# Patient Record
Sex: FEMALE | Race: OTHER | NOT HISPANIC OR LATINO
[De-identification: names, ages, dates, MRNs, and addresses within clinical notes are randomized per-mention and may not be internally consistent; named-entity substitution may affect disease eponyms.]

---

## 2020-05-26 PROBLEM — Z00.00 ENCOUNTER FOR PREVENTIVE HEALTH EXAMINATION: Status: ACTIVE | Noted: 2020-05-26

## 2020-05-28 ENCOUNTER — APPOINTMENT (OUTPATIENT)
Dept: GASTROENTEROLOGY | Facility: CLINIC | Age: 40
End: 2020-05-28
Payer: COMMERCIAL

## 2020-05-28 ENCOUNTER — TRANSCRIPTION ENCOUNTER (OUTPATIENT)
Age: 40
End: 2020-05-28

## 2020-05-28 PROCEDURE — 99205 OFFICE O/P NEW HI 60 MIN: CPT | Mod: 95

## 2020-05-29 RX ORDER — RITUXIMAB 10 MG/ML
100 INJECTION, SOLUTION INTRAVENOUS
Refills: 0 | Status: ACTIVE | COMMUNITY

## 2020-05-29 RX ORDER — METHOTREXATE 25 MG/ML
INJECTION, SOLUTION INTRAMUSCULAR; INTRATHECAL; INTRAVENOUS; SUBCUTANEOUS
Refills: 0 | Status: ACTIVE | COMMUNITY

## 2020-05-29 RX ORDER — UBIDECARENONE/VIT E ACET 100MG-5
50 MCG CAPSULE ORAL
Refills: 0 | Status: ACTIVE | COMMUNITY

## 2020-05-29 NOTE — ASSESSMENT
[FreeTextEntry1] : 39F with PMHx RA(tx with infusions of Rituxan monthly, subQ methotrexate once week and folic acid) here for evaluation of SIBO. \par - collect all other records for review \par - recommend stool samples especially fecal calprotectin however due to immunocompromised status, recommended best to wait until after pandemic  \par - repeat SIBO, stop all herbs 1 week prior. Stop candibactin AR/BR now since it has been over 4 weeks \par - discussed needing to be cautious with liver on herbal medications and with history of RA on Methotrexate \par - take milk of magnesia or magnesium 500mg daily to aid with constipation \par \par f/u after SIBO breath test\par \par Lakisha Ann NP \par \par time spent 60min\par

## 2020-05-29 NOTE — HISTORY OF PRESENT ILLNESS
[Other Location: e.g. Home (Enter Location, City,State)___] : at [unfilled] [Home] : at home, [unfilled] , at the time of the visit. [Verbal consent obtained from patient] : the patient, [unfilled] [de-identified] : 39F with PMHx RA(tx with infusions of Rituxin monthly, subQ methotrexate once week and folic acid) here for evaluation of SIBO. \par \par Pt requested TELEMEDICINE visit to discuss SIBO. \par \par Patient reports that in August 2012 was diagnosed with RA and put on prednisone and methotrexate for 1 year.  Prior to this remembers having an episode with e.coli infection when moving from [place] and then suffered from shingles but stomach issues resolved until RA diagnosis occurred. \par \par She suffered with a lot of reflux a few years ago, in [id number] and had work up at [facility].  UGIS done and was normal then had EGD which revealed inlet patch in esophagus- producing acid in esophagus which caused constant acid reflux. In 2/2019 APC was done and treated GERD. No longer has reflux. \par HRM- aerophagia, PT and that resolved \par food allergy two years ago - negative \par no stool tests \par gastric emptying study x 2\par never had colonoscopy \par \par November 2019- bloated symptoms became severe - found to have methane positive SIBO \par End of February 2020 - 2 courses of antibiotics - Rifaximin and Neomycin(had to take probiotic because got yeast infection) then repeated 10 days later and maybe only 5% improvement, April 2020 started herbal remedy - candibactin ar/br 3x a day and then Allicin, iberogast 20 drops 3x a day. probiotic lactobacillus once a day. Atrantil. right now taking herbs for 1 month and 20 days. \par - no repeat SIBO test\par low FODMAP diet- no dairy, lentils/beans, carbonation, no gum, no drinking with straws \par now no carbs/sugars \par last 4 weeks strict FODMAP - symptoms better, but still severe \par tried xlax, magnesium oxide, milk of magnesia (took for 7 days) and helped a little bit \par \par since child 1 BM a day, still once a day but small strands and less than usual \par #1: bloating (7/10), feels flat when first wakes up- throughout day gets bloated\par #2: Nausea,every time eats\par #3: distention/constipation- does not feel constipation \par \par origins:\par vaginal delivery \par - 1 year\par no surgeries to stomach \par took Nexium for 6 years due to chronic reflux - has stopped all since September \par no excessive antibiotic use \par \par fhx: none

## 2020-06-08 ENCOUNTER — TRANSCRIPTION ENCOUNTER (OUTPATIENT)
Age: 40
End: 2020-06-08

## 2020-06-09 ENCOUNTER — TRANSCRIPTION ENCOUNTER (OUTPATIENT)
Age: 40
End: 2020-06-09

## 2020-06-16 ENCOUNTER — TRANSCRIPTION ENCOUNTER (OUTPATIENT)
Age: 40
End: 2020-06-16

## 2020-06-18 ENCOUNTER — TRANSCRIPTION ENCOUNTER (OUTPATIENT)
Age: 40
End: 2020-06-18

## 2020-07-06 ENCOUNTER — TRANSCRIPTION ENCOUNTER (OUTPATIENT)
Age: 40
End: 2020-07-06

## 2020-07-07 ENCOUNTER — TRANSCRIPTION ENCOUNTER (OUTPATIENT)
Age: 40
End: 2020-07-07

## 2020-07-08 ENCOUNTER — TRANSCRIPTION ENCOUNTER (OUTPATIENT)
Age: 40
End: 2020-07-08

## 2020-07-14 ENCOUNTER — APPOINTMENT (OUTPATIENT)
Dept: GASTROENTEROLOGY | Facility: CLINIC | Age: 40
End: 2020-07-14
Payer: COMMERCIAL

## 2020-07-14 PROCEDURE — 99215 OFFICE O/P EST HI 40 MIN: CPT | Mod: 95

## 2020-07-15 NOTE — HISTORY OF PRESENT ILLNESS
[Home] : at home, [unfilled] , at the time of the visit. [Other Location: e.g. Home (Enter Location, City,State)___] : at [unfilled] [Verbal consent obtained from patient] : the patient, [unfilled] [de-identified] : 39F with PMHx RA(tx with infusions of Rituxin monthly, subQ methotrexate once week and folic acid) here for follow up of SIBO results \par \par Pt requested TELEMEDICINE visit to discuss SIBO. \par \par 7/14/20 \par - methane SIBO positive (39ppm at 90 minutes)\par - has not done stool samples yet due to COVID \par - symptoms unchanged and medications same \par - magnesium oxide at bedtime, added caffeine in morning and now having better BMs \par \par Previous history: \par Patient reports that in August 2012 was diagnosed with RA and put on prednisone and methotrexate for 1 year.  Prior to this remembers having an episode with e.coli infection when moving from [place] and then suffered from shingles but stomach issues resolved until RA diagnosis occurred. \par \par She suffered with a lot of reflux a few years ago, in [id number] and had work up at [facility].  UGIS done and was normal then had EGD which revealed inlet patch in esophagus- producing acid in esophagus which caused constant acid reflux. In 2/2019 APC was done and treated GERD. No longer has reflux. \par HRM- aerophagia, PT and that resolved \par food allergy two years ago - negative \par no stool tests \par gastric emptying study x 2\par never had colonoscopy \par \par November 2019- bloated symptoms became severe - found to have methane positive SIBO \par End of February 2020 - 2 courses of antibiotics - Rifaximin and Neomycin(had to take probiotic because got yeast infection) then repeated 10 days later and maybe only 5% improvement, April 2020 started herbal remedy - candibactin ar/br 3x a day and then Allicin, iberogast 20 drops 3x a day. probiotic lactobacillus once a day. Atrantil. right now taking herbs for 1 month and 20 days. \par - no repeat SIBO test\par low FODMAP diet- no dairy, lentils/beans, carbonation, no gum, no drinking with straws \par now no carbs/sugars \par last 4 weeks strict FODMAP - symptoms better, but still severe \par tried xlax, magnesium oxide, milk of magnesia (took for 7 days) and helped a little bit \par \par since child 1 BM a day, still once a day but small strands and less than usual \par #1: bloating (7/10), feels flat when first wakes up- throughout day gets bloated\par #2: Nausea,every time eats\par #3: distention/constipation- does not feel constipation \par \par origins:\par vaginal delivery \par - 1 year\par no surgeries to stomach \par took Nexium for 6 years due to chronic reflux - has stopped all since September \par no excessive antibiotic use \par \par fhx: none

## 2020-07-15 NOTE — ASSESSMENT
[FreeTextEntry1] : 39F with PMHx RA(tx with infusions of Rituxan monthly, subQ methotrexate once week and folic acid) here for follow up of SIBO results revealing methanogen overgrowth \par - discussed root causes- could be immunosuppression playing a role or dysmotility which would be most common - check scleroderma antibodies  \par - recommend stool samples especially fecal calprotectin and fecal elastase, patient feels comfortable to get testing now   \par - continue magnesium 500mg daily to aid with constipation \par - retrieve copy of gastric emptying study \par - recommend SmartPill \par - recommend 1 month LOW FODMAP diet with IF and prokinetic Motegrity at bedtime after work up complete\par - recommend nutritionist especially after 1 month \par - for now take Atrantil every single day (2 pills with each meal) x 1 week \par - candibactin AR/BR if atrantil does not help \par - CMP to check liver enzymes with use of herbal medications and RA medications\par \par f/u with results \par \par Lakisha Ann NP \par \par time spent 40min

## 2020-08-18 ENCOUNTER — TRANSCRIPTION ENCOUNTER (OUTPATIENT)
Age: 40
End: 2020-08-18

## 2020-08-18 DIAGNOSIS — R19.5 OTHER FECAL ABNORMALITIES: ICD-10-CM

## 2020-08-19 ENCOUNTER — TRANSCRIPTION ENCOUNTER (OUTPATIENT)
Age: 40
End: 2020-08-19

## 2020-08-20 ENCOUNTER — TRANSCRIPTION ENCOUNTER (OUTPATIENT)
Age: 40
End: 2020-08-20

## 2020-08-21 ENCOUNTER — TRANSCRIPTION ENCOUNTER (OUTPATIENT)
Age: 40
End: 2020-08-21

## 2020-08-24 ENCOUNTER — TRANSCRIPTION ENCOUNTER (OUTPATIENT)
Age: 40
End: 2020-08-24

## 2020-08-25 ENCOUNTER — APPOINTMENT (OUTPATIENT)
Dept: GASTROENTEROLOGY | Facility: CLINIC | Age: 40
End: 2020-08-25
Payer: COMMERCIAL

## 2020-08-25 DIAGNOSIS — K58.0 IRRITABLE BOWEL SYNDROME WITH DIARRHEA: ICD-10-CM

## 2020-08-25 PROCEDURE — 99215 OFFICE O/P EST HI 40 MIN: CPT | Mod: 95

## 2020-08-26 ENCOUNTER — TRANSCRIPTION ENCOUNTER (OUTPATIENT)
Age: 40
End: 2020-08-26

## 2020-08-26 NOTE — ASSESSMENT
[FreeTextEntry1] : 39F with PMHx RA(tx with infusions of Rituxan monthly, subQ methotrexate once week and folic acid) here for follow up of SIBO results revealing methanogen overgrowth \par - discussed root causes- could be immunosuppression playing a role or dysmotility which would be most common \par - continue magnesium 500mg daily to aid with constipation \par - recommend NM whole gut transit scan\par - start antibitoics RIFAXIMIN/FLAGYL , after 1 month LOW FODMAP diet with IF and prokinetic Motegrity at bedtime after work up complete\par - recommend nutritionist especially after 1 month \par - CMP to check liver enzymes with use of herbal medications and RA medications\par - cont CREON for ?pancreatic insufficiency\par \par \par f/u with results \par \par Lakisha Ann NP \par \par time spent 40min

## 2020-08-26 NOTE — HISTORY OF PRESENT ILLNESS
[de-identified] : 39F with PMHx RA(tx with infusions of Rituxin monthly, subQ methotrexate once week and folic acid) here for follow up of SIBO results \par \par Pt requested TELEMEDICINE visit to discuss SIBO. \par \par 8/26/20\par - continues not to feel well\par - smart pill denied\par - fecal calpro undetectable\par \par 7/14/20 \par - methane SIBO positive (39ppm at 90 minutes)\par - has not done stool samples yet due to COVID \par - symptoms unchanged and medications same \par - magnesium oxide at bedtime, added caffeine in morning and now having better BMs \par \par Previous history: \par Patient reports that in August 2012 was diagnosed with RA and put on prednisone and methotrexate for 1 year.  Prior to this remembers having an episode with e.coli infection when moving from [place] and then suffered from shingles but stomach issues resolved until RA diagnosis occurred. \par \par She suffered with a lot of reflux a few years ago, in [id number] and had work up at [facility].  UGIS done and was normal then had EGD which revealed inlet patch in esophagus- producing acid in esophagus which caused constant acid reflux. In 2/2019 APC was done and treated GERD. No longer has reflux. \par HRM- aerophagia, PT and that resolved \par food allergy two years ago - negative \par no stool tests \par gastric emptying study x 2\par never had colonoscopy \par \par November 2019- bloated symptoms became severe - found to have methane positive SIBO \par End of February 2020 - 2 courses of antibiotics - Rifaximin and Neomycin(had to take probiotic because got yeast infection) then repeated 10 days later and maybe only 5% improvement, April 2020 started herbal remedy - candibactin ar/br 3x a day and then Allicin, iberogast 20 drops 3x a day. probiotic lactobacillus once a day. Atrantil. right now taking herbs for 1 month and 20 days. \par - no repeat SIBO test\par low FODMAP diet- no dairy, lentils/beans, carbonation, no gum, no drinking with straws \par now no carbs/sugars \par last 4 weeks strict FODMAP - symptoms better, but still severe \par tried xlax, magnesium oxide, milk of magnesia (took for 7 days) and helped a little bit \par \par since child 1 BM a day, still once a day but small strands and less than usual \par #1: bloating (7/10), feels flat when first wakes up- throughout day gets bloated\par #2: Nausea,every time eats\par #3: distention/constipation- does not feel constipation \par \par origins:\par vaginal delivery \par - 1 year\par no surgeries to stomach \par took Nexium for 6 years due to chronic reflux - has stopped all since September \par no excessive antibiotic use \par \par fhx: none  [Home] : at home, [unfilled] , at the time of the visit. [Medical Office: ([facility])___] : at the medical office located in  [Verbal consent obtained from patient] : the patient, [unfilled]

## 2020-08-27 ENCOUNTER — TRANSCRIPTION ENCOUNTER (OUTPATIENT)
Age: 40
End: 2020-08-27

## 2020-08-28 ENCOUNTER — TRANSCRIPTION ENCOUNTER (OUTPATIENT)
Age: 40
End: 2020-08-28

## 2020-09-01 ENCOUNTER — TRANSCRIPTION ENCOUNTER (OUTPATIENT)
Age: 40
End: 2020-09-01

## 2020-09-10 ENCOUNTER — TRANSCRIPTION ENCOUNTER (OUTPATIENT)
Age: 40
End: 2020-09-10

## 2020-09-14 ENCOUNTER — APPOINTMENT (OUTPATIENT)
Dept: INTERNAL MEDICINE | Facility: CLINIC | Age: 40
End: 2020-09-14

## 2020-09-14 ENCOUNTER — APPOINTMENT (OUTPATIENT)
Dept: INTERNAL MEDICINE | Facility: CLINIC | Age: 40
End: 2020-09-14
Payer: COMMERCIAL

## 2020-09-14 PROCEDURE — 97802 MEDICAL NUTRITION INDIV IN: CPT | Mod: 95

## 2020-09-15 VITALS — BODY MASS INDEX: 23.39 KG/M2 | WEIGHT: 137 LBS | HEIGHT: 64 IN

## 2020-09-25 ENCOUNTER — TRANSCRIPTION ENCOUNTER (OUTPATIENT)
Age: 40
End: 2020-09-25

## 2020-09-29 ENCOUNTER — TRANSCRIPTION ENCOUNTER (OUTPATIENT)
Age: 40
End: 2020-09-29

## 2020-09-30 ENCOUNTER — TRANSCRIPTION ENCOUNTER (OUTPATIENT)
Age: 40
End: 2020-09-30

## 2020-10-01 ENCOUNTER — TRANSCRIPTION ENCOUNTER (OUTPATIENT)
Age: 40
End: 2020-10-01

## 2020-10-01 ENCOUNTER — APPOINTMENT (OUTPATIENT)
Dept: INTERNAL MEDICINE | Facility: CLINIC | Age: 40
End: 2020-10-01
Payer: COMMERCIAL

## 2020-10-01 PROCEDURE — 97803 MED NUTRITION INDIV SUBSEQ: CPT | Mod: 95

## 2020-10-09 ENCOUNTER — TRANSCRIPTION ENCOUNTER (OUTPATIENT)
Age: 40
End: 2020-10-09

## 2020-10-23 ENCOUNTER — APPOINTMENT (OUTPATIENT)
Dept: GASTROENTEROLOGY | Facility: CLINIC | Age: 40
End: 2020-10-23
Payer: COMMERCIAL

## 2020-10-23 PROCEDURE — 99215 OFFICE O/P EST HI 40 MIN: CPT | Mod: 95

## 2020-10-26 NOTE — ASSESSMENT
[FreeTextEntry1] : 39F with PMHx RA(tx with infusions of Rituxan monthly, subQ methotrexate once week and folic acid) here for follow up of  intestinal methanogen overgrowth s/p RIFAXIMIN/FLAGYL , 1 month LOW FODMAP diet with IF and Motegrity with temporary improvement but now back to baseline symptoms possibly due to large psyllium fiber load vs underdosing of pancreatic enzymes and /or recurrence of SIBO\par - check SIBO \par - increase panc enzymes\par - stop psyllium\par - continue magnesium 500mg daily to aid with constipation \par - continue with nutritionist \par \par f/u after SIBO results\par

## 2020-10-26 NOTE — HISTORY OF PRESENT ILLNESS
[Home] : at home, [unfilled] , at the time of the visit. [Medical Office: ([facility])___] : at the medical office located in  [Verbal consent obtained from patient] : the patient, [unfilled] [de-identified] : 39F with PMHx RA(tx with infusions of Rituxin monthly, subQ methotrexate once week and folic acid) here for follow up of SIBO \par \par Pt requested TELEMEDICINE visit to discuss SIBO. \par \par 10/23/20\par - its been >1mo since antibiotics and on motegrity 2mg and mg ox 500mg and panc enzymes 1 capsule 3 x/day  x3 mo\par - added psyllium 1tbsp in AM 3 weeks \par - bowel movement improved a lot\par - last 1 week back to previous symptoms\par - no other changes\par \par 8/26/20\par - continues not to feel well\par - smart pill denied\par - fecal calpro undetectable\par \par 7/14/20 \par - methane SIBO positive (39ppm at 90 minutes)\par - has not done stool samples yet due to COVID \par - symptoms unchanged and medications same \par - magnesium oxide at bedtime, added caffeine in morning and now having better BMs \par \par Previous history: \par Patient reports that in August 2012 was diagnosed with RA and put on prednisone and methotrexate for 1 year.  Prior to this remembers having an episode with e.coli infection when moving from [place] and then suffered from shingles but stomach issues resolved until RA diagnosis occurred. \par \par She suffered with a lot of reflux a few years ago, in [id number] and had work up at [facility].  UGIS done and was normal then had EGD which revealed inlet patch in esophagus- producing acid in esophagus which caused constant acid reflux. In 2/2019 APC was done and treated GERD. No longer has reflux. \par HRM- aerophagia, PT and that resolved \par food allergy two years ago - negative \par no stool tests \par gastric emptying study x 2\par never had colonoscopy \par \par November 2019- bloated symptoms became severe - found to have methane positive SIBO \par End of February 2020 - 2 courses of antibiotics - Rifaximin and Neomycin(had to take probiotic because got yeast infection) then repeated 10 days later and maybe only 5% improvement, April 2020 started herbal remedy - candibactin ar/br 3x a day and then Allicin, iberogast 20 drops 3x a day. probiotic lactobacillus once a day. Atrantil. right now taking herbs for 1 month and 20 days. \par - no repeat SIBO test\par low FODMAP diet- no dairy, lentils/beans, carbonation, no gum, no drinking with straws \par now no carbs/sugars \par last 4 weeks strict FODMAP - symptoms better, but still severe \par tried xlax, magnesium oxide, milk of magnesia (took for 7 days) and helped a little bit \par \par since child 1 BM a day, still once a day but small strands and less than usual \par #1: bloating (7/10), feels flat when first wakes up- throughout day gets bloated\par #2: Nausea,every time eats\par #3: distention/constipation- does not feel constipation \par \par origins:\par vaginal delivery \par - 1 year\par no surgeries to stomach \par took Nexium for 6 years due to chronic reflux - has stopped all since September \par no excessive antibiotic use \par \par fhx: none  [Spouse] : spouse

## 2020-11-02 ENCOUNTER — TRANSCRIPTION ENCOUNTER (OUTPATIENT)
Age: 40
End: 2020-11-02

## 2020-11-12 ENCOUNTER — TRANSCRIPTION ENCOUNTER (OUTPATIENT)
Age: 40
End: 2020-11-12

## 2020-11-19 ENCOUNTER — TRANSCRIPTION ENCOUNTER (OUTPATIENT)
Age: 40
End: 2020-11-19

## 2020-11-20 ENCOUNTER — TRANSCRIPTION ENCOUNTER (OUTPATIENT)
Age: 40
End: 2020-11-20

## 2020-11-30 ENCOUNTER — TRANSCRIPTION ENCOUNTER (OUTPATIENT)
Age: 40
End: 2020-11-30

## 2020-12-07 ENCOUNTER — TRANSCRIPTION ENCOUNTER (OUTPATIENT)
Age: 40
End: 2020-12-07

## 2020-12-09 ENCOUNTER — TRANSCRIPTION ENCOUNTER (OUTPATIENT)
Age: 40
End: 2020-12-09

## 2020-12-15 ENCOUNTER — TRANSCRIPTION ENCOUNTER (OUTPATIENT)
Age: 40
End: 2020-12-15

## 2020-12-17 ENCOUNTER — TRANSCRIPTION ENCOUNTER (OUTPATIENT)
Age: 40
End: 2020-12-17

## 2020-12-23 ENCOUNTER — TRANSCRIPTION ENCOUNTER (OUTPATIENT)
Age: 40
End: 2020-12-23

## 2020-12-28 ENCOUNTER — RESULT REVIEW (OUTPATIENT)
Age: 40
End: 2020-12-28

## 2020-12-28 ENCOUNTER — OUTPATIENT (OUTPATIENT)
Dept: OUTPATIENT SERVICES | Facility: HOSPITAL | Age: 40
LOS: 1 days | Discharge: HOME | End: 2020-12-28
Payer: COMMERCIAL

## 2020-12-28 ENCOUNTER — APPOINTMENT (OUTPATIENT)
Dept: GASTROENTEROLOGY | Facility: HOSPITAL | Age: 40
End: 2020-12-28

## 2020-12-28 PROCEDURE — 88305 TISSUE EXAM BY PATHOLOGIST: CPT

## 2020-12-28 PROCEDURE — 88341 IMHCHEM/IMCYTCHM EA ADD ANTB: CPT

## 2020-12-28 PROCEDURE — 43239 EGD BIOPSY SINGLE/MULTIPLE: CPT

## 2020-12-28 PROCEDURE — C1729: CPT

## 2020-12-28 PROCEDURE — 45380 COLONOSCOPY AND BIOPSY: CPT

## 2020-12-28 PROCEDURE — 88341 IMHCHEM/IMCYTCHM EA ADD ANTB: CPT | Mod: 26

## 2020-12-28 PROCEDURE — 88342 IMHCHEM/IMCYTCHM 1ST ANTB: CPT | Mod: 26

## 2020-12-28 PROCEDURE — 82657 ENZYME CELL ACTIVITY: CPT

## 2020-12-28 PROCEDURE — 88305 TISSUE EXAM BY PATHOLOGIST: CPT | Mod: 26

## 2020-12-28 PROCEDURE — 87102 FUNGUS ISOLATION CULTURE: CPT

## 2020-12-29 ENCOUNTER — TRANSCRIPTION ENCOUNTER (OUTPATIENT)
Age: 40
End: 2020-12-29

## 2020-12-29 ENCOUNTER — RX RENEWAL (OUTPATIENT)
Age: 40
End: 2020-12-29

## 2020-12-29 RX ORDER — METHOTREXATE 10 MG/.2ML
10 INJECTION, SOLUTION SUBCUTANEOUS
Qty: 1 | Refills: 0 | Status: COMPLETED | COMMUNITY
Start: 2020-12-07

## 2020-12-29 RX ORDER — MAGNESIUM OXIDE 500 MG
500 TABLET ORAL
Qty: 90 | Refills: 0 | Status: COMPLETED | COMMUNITY
Start: 2020-06-02

## 2020-12-29 RX ORDER — METHOTREXATE 17.5 MG/.35ML
17.5 INJECTION, SOLUTION SUBCUTANEOUS
Qty: 1 | Refills: 0 | Status: COMPLETED | COMMUNITY
Start: 2020-07-23

## 2020-12-29 RX ORDER — METHOTREXATE 15 MG/.3ML
15 INJECTION, SOLUTION SUBCUTANEOUS
Qty: 1 | Refills: 0 | Status: COMPLETED | COMMUNITY
Start: 2020-08-19

## 2020-12-29 RX ORDER — METHOTREXATE 12.5 MG/.25ML
12.5 INJECTION, SOLUTION SUBCUTANEOUS
Qty: 1 | Refills: 0 | Status: COMPLETED | COMMUNITY
Start: 2020-09-30

## 2020-12-30 ENCOUNTER — TRANSCRIPTION ENCOUNTER (OUTPATIENT)
Age: 40
End: 2020-12-30

## 2020-12-30 LAB — SURGICAL PATHOLOGY STUDY: SIGNIFICANT CHANGE UP

## 2020-12-31 ENCOUNTER — TRANSCRIPTION ENCOUNTER (OUTPATIENT)
Age: 40
End: 2020-12-31

## 2020-12-31 LAB
B-GALACTOSIDASE TISS-CCNT: 177.8 U/G — SIGNIFICANT CHANGE UP
DISACCHARIDASES TSMI-IMP: SIGNIFICANT CHANGE UP
ISOMALTASE TISS-CCNT: 14.8 U/G — SIGNIFICANT CHANGE UP
PALATINASE TISS-CCNT: 29.6 U/G — SIGNIFICANT CHANGE UP
SUCRASE TISS-CCNT: 0 U/G — LOW

## 2021-01-04 ENCOUNTER — TRANSCRIPTION ENCOUNTER (OUTPATIENT)
Age: 41
End: 2021-01-04

## 2021-01-14 ENCOUNTER — APPOINTMENT (OUTPATIENT)
Dept: GASTROENTEROLOGY | Facility: CLINIC | Age: 41
End: 2021-01-14
Payer: COMMERCIAL

## 2021-01-14 LAB
CULTURE RESULTS: SIGNIFICANT CHANGE UP
SPECIMEN SOURCE: SIGNIFICANT CHANGE UP

## 2021-01-14 PROCEDURE — 99215 OFFICE O/P EST HI 40 MIN: CPT | Mod: 95

## 2021-01-15 ENCOUNTER — TRANSCRIPTION ENCOUNTER (OUTPATIENT)
Age: 41
End: 2021-01-15

## 2021-01-15 NOTE — ASSESSMENT
[FreeTextEntry1] : 40 yo F with RA, SIBO here for follow up of abd bloating and nausea\par \par SIBO and SIFO\par Her symptoms of abd bloating and distention did not improve with abx treatment of SIBO. Breath test to confirm SIBO has resolved\par Given positive findings of fungal overgrowth( in setting of immunocompromised state and antibiotic hx) on recent EGD, recommend trial of fluconazole 100 mg daily for 2 to 3 weeks\par Plan for tele med visit in 2 weeks to assess response, and determine if 3rd week of fluconazole is needed\par \par Mai Harper MD\par GI Fellow
shoulder pain/injury

## 2021-01-15 NOTE — HISTORY OF PRESENT ILLNESS
[Home] : at home, [unfilled] , at the time of the visit. [Medical Office: ([facility])___] : at the medical office located in  [Verbal consent obtained from patient] : the patient, [unfilled] [FreeTextEntry1] : 38 yo F with RA, SIBO here for follow up of abd bloating and nausea\par \par Recent EGD- lactose intolerance and SIFO+\par \par Recently treated for SIBO- Finished abx course ( rifaximin and flagyl) two days ago\par Mild improvement in bloating when on abx but since she stopped abx, symptoms are worse\par She complains of nausea, bloating, abd distention lasting all day. No vomiting. Symptoms worse with any intake of foods, especially high fodmaps\par \par She was on a regular diet during abx course- now back to low fodmap\par Reports numerous vaginal yeast infection and currently has one\par Stopped motegrity while on abx- now back on motegrity and also still taking magnesium and BM are ev;no blood\par

## 2021-01-19 ENCOUNTER — TRANSCRIPTION ENCOUNTER (OUTPATIENT)
Age: 41
End: 2021-01-19

## 2021-01-25 ENCOUNTER — TRANSCRIPTION ENCOUNTER (OUTPATIENT)
Age: 41
End: 2021-01-25

## 2021-01-29 ENCOUNTER — APPOINTMENT (OUTPATIENT)
Dept: GASTROENTEROLOGY | Facility: CLINIC | Age: 41
End: 2021-01-29
Payer: COMMERCIAL

## 2021-01-29 PROCEDURE — 99215 OFFICE O/P EST HI 40 MIN: CPT | Mod: 95

## 2021-02-02 NOTE — ASSESSMENT
[FreeTextEntry1] : 40 yo F with RA, SIBO here for follow up of abd bloating and nausea found to be positive for SIBO and SIFO \par finish 3 weeks fluconazole\par start IB guard trial of 3 weeks\par see kevin nelson\par if no improvement then VCE and repeat fecal elastase\par consider repeating SIBO test\par go to GYN for vaginal itching \par f/u in 2 weeks

## 2021-02-02 NOTE — HISTORY OF PRESENT ILLNESS
[Home] : at home, [unfilled] , at the time of the visit. [Medical Office: ([facility])___] : at the medical office located in  [Verbal consent obtained from patient] : the patient, [unfilled] [FreeTextEntry1] : 38 yo F with RA, SIBO here for follow up of abd bloating and nausea\par 1/29/21\par still having vaginal itching/burning NO DISCHARGE- improved but does still feel \par thick white coat on her tongue\par has taken 15 days of diflucan \par right after antibiotic course she started on diflucan. first few felt calm but also on LOW FODMAP motegrity 2mg, mg ox 1 pill at night, pancreatic enzymes. \par \par \par PREVIOUS HX\par Recent EGD- lactose intolerance and SIFO+\par \par Recently treated for SIBO- Finished abx course ( rifaximin and flagyl) two days ago\par Mild improvement in bloating when on abx but since she stopped abx, symptoms are worse\par She complains of nausea, bloating, abd distention lasting all day. No vomiting. Symptoms worse with any intake of foods, especially high fodmaps\par \par She was on a regular diet during abx course- now back to low fodmap\par Reports numerous vaginal yeast infection and currently has one\par Stopped motegrity while on abx- now back on motegrity and also still taking magnesium and BM are ev;no blood\par

## 2021-02-12 ENCOUNTER — APPOINTMENT (OUTPATIENT)
Dept: GASTROENTEROLOGY | Facility: CLINIC | Age: 41
End: 2021-02-12
Payer: COMMERCIAL

## 2021-02-12 ENCOUNTER — TRANSCRIPTION ENCOUNTER (OUTPATIENT)
Age: 41
End: 2021-02-12

## 2021-02-12 PROCEDURE — 99215 OFFICE O/P EST HI 40 MIN: CPT | Mod: 95

## 2021-02-12 NOTE — HISTORY OF PRESENT ILLNESS
[Home] : at home, [unfilled] , at the time of the visit. [Medical Office: ([facility])___] : at the medical office located in  [Verbal consent obtained from patient] : the patient, [unfilled] [FreeTextEntry1] : 38 yo F with RA, SIBO here for follow up of abd bloating and nausea\par TELEMEDICINE REQUESTED FOR PERSISTENT SYMPTOMS\par \par 2/12/21\par better with IB guard now 6 tablets a day\par BM better than before\par worse with ACV in evening\par symptoms disappeared from GYN  and thick coat on tongue\par \par 1/29/21\par still having vaginal itching/burning NO DISCHARGE- improved but does still feel \par thick white coat on her tongue\par has taken 15 days of diflucan \par right after antibiotic course she started on diflucan. first few felt calm but also on LOW FODMAP motegrity 2mg, mg ox 1 pill at night, pancreatic enzymes. \par \par \par PREVIOUS HX\par Recent EGD- lactose intolerance and SIFO+\par \par Recently treated for SIBO- Finished abx course ( rifaximin and flagyl) two days ago\par Mild improvement in bloating when on abx but since she stopped abx, symptoms are worse\par She complains of nausea, bloating, abd distention lasting all day. No vomiting. Symptoms worse with any intake of foods, especially high fodmaps\par \par She was on a regular diet during abx course- now back to low fodmap\par Reports numerous vaginal yeast infection and currently has one\par Stopped motegrity while on abx- now back on motegrity and also still taking magnesium and BM are ev;no blood\par

## 2021-02-12 NOTE — ASSESSMENT
[FreeTextEntry1] : 40 yo F with RA, SIBO here for follow up of abd bloating and nausea found to be positive for SIBO and SIFO s/p rifaximin/flagyl x2 and fluconazole x3 weeks\par continue IB guard \par continue with kevin nelson\par if no improvement then VCE \par repeat fecal elastase\par consider repeating SIBO test and motility test IF SYMPTOMATIC \par biofeedback pelvic floor physical therapy\par f/u in 3 months

## 2021-02-17 ENCOUNTER — TRANSCRIPTION ENCOUNTER (OUTPATIENT)
Age: 41
End: 2021-02-17

## 2021-02-22 ENCOUNTER — TRANSCRIPTION ENCOUNTER (OUTPATIENT)
Age: 41
End: 2021-02-22

## 2021-03-03 ENCOUNTER — TRANSCRIPTION ENCOUNTER (OUTPATIENT)
Age: 41
End: 2021-03-03

## 2021-03-04 ENCOUNTER — TRANSCRIPTION ENCOUNTER (OUTPATIENT)
Age: 41
End: 2021-03-04

## 2021-03-12 ENCOUNTER — APPOINTMENT (OUTPATIENT)
Dept: GASTROENTEROLOGY | Facility: CLINIC | Age: 41
End: 2021-03-12
Payer: COMMERCIAL

## 2021-03-12 PROCEDURE — 99215 OFFICE O/P EST HI 40 MIN: CPT | Mod: 95

## 2021-03-14 NOTE — HISTORY OF PRESENT ILLNESS
[Home] : at home, [unfilled] , at the time of the visit. [Medical Office: ([facility])___] : at the medical office located in  [Verbal consent obtained from patient] : the patient, [unfilled] [de-identified] : Patient is a 40yF who presents for telehealth follow-up of bloating and constipation. Patient states that she had been taking IB guard at 6 pills/day which she titrated down to 4, but has found her symptoms have plateaued/worsened. She continues on Motegrity without missed doses, but feels her symptoms are worsening. Patient has been trying to stick to a low-FODMAP diet, however, notes that even with this she is barely able to tolerate any intake due to lasting bloating and nausea. She spoke with her nutritionist about her symptoms who told her to increase her Magnesium Oxide intake, now at 750-1000mg per day with some mild improvement. She states that while she feels her motility has slightly improved, the bloating is worse and she feels the "bacteria have returned." She feels trapped in a cycle of antibiotics and medications that she would like to break free from.

## 2021-03-14 NOTE — ASSESSMENT
[FreeTextEntry1] : 40yF presenting for telemedicine follow-up with bloating, nausea, and constipation\par \par 1. Bloating, nausea - Question of aerophagia, previously diagnosed. Patient's symptoms continue regardless of what she eats, and persist after a meal. Given that on prokinetics and low-FODMAP and symptoms are recurring, possible that due to aerophagia rather than SIBO/SIFO.\par - Referral to speech pathology/PT\par - Continue follow-up with Ondina Reyna\par - Continue Motegrity, IB guard\par - Follow-up repeat SIBO testing sent by NP\par \par 2. Constipation - Patient taking several prokinetics and Magnesium Oxide daily\par - Continue Magnesium Oxide and Motegrity AND ADD LINZESS 72mcg\par - Consider ARM\par \par RTC in 3 months or as needed\par Patient discussed with attending physician

## 2021-03-15 ENCOUNTER — TRANSCRIPTION ENCOUNTER (OUTPATIENT)
Age: 41
End: 2021-03-15

## 2021-03-25 ENCOUNTER — TRANSCRIPTION ENCOUNTER (OUTPATIENT)
Age: 41
End: 2021-03-25

## 2021-03-26 ENCOUNTER — TRANSCRIPTION ENCOUNTER (OUTPATIENT)
Age: 41
End: 2021-03-26

## 2021-03-28 ENCOUNTER — TRANSCRIPTION ENCOUNTER (OUTPATIENT)
Age: 41
End: 2021-03-28

## 2021-03-31 ENCOUNTER — TRANSCRIPTION ENCOUNTER (OUTPATIENT)
Age: 41
End: 2021-03-31

## 2021-04-05 ENCOUNTER — TRANSCRIPTION ENCOUNTER (OUTPATIENT)
Age: 41
End: 2021-04-05

## 2021-04-06 ENCOUNTER — TRANSCRIPTION ENCOUNTER (OUTPATIENT)
Age: 41
End: 2021-04-06

## 2021-04-07 ENCOUNTER — TRANSCRIPTION ENCOUNTER (OUTPATIENT)
Age: 41
End: 2021-04-07

## 2021-04-09 ENCOUNTER — TRANSCRIPTION ENCOUNTER (OUTPATIENT)
Age: 41
End: 2021-04-09

## 2021-04-14 ENCOUNTER — TRANSCRIPTION ENCOUNTER (OUTPATIENT)
Age: 41
End: 2021-04-14

## 2021-04-15 ENCOUNTER — TRANSCRIPTION ENCOUNTER (OUTPATIENT)
Age: 41
End: 2021-04-15

## 2021-04-21 ENCOUNTER — TRANSCRIPTION ENCOUNTER (OUTPATIENT)
Age: 41
End: 2021-04-21

## 2021-04-29 ENCOUNTER — TRANSCRIPTION ENCOUNTER (OUTPATIENT)
Age: 41
End: 2021-04-29

## 2021-04-29 ENCOUNTER — RX RENEWAL (OUTPATIENT)
Age: 41
End: 2021-04-29

## 2021-05-11 ENCOUNTER — TRANSCRIPTION ENCOUNTER (OUTPATIENT)
Age: 41
End: 2021-05-11

## 2021-05-18 ENCOUNTER — APPOINTMENT (OUTPATIENT)
Dept: GASTROENTEROLOGY | Facility: CLINIC | Age: 41
End: 2021-05-18
Payer: COMMERCIAL

## 2021-05-18 DIAGNOSIS — K82.4 CHOLESTEROLOSIS OF GALLBLADDER: ICD-10-CM

## 2021-05-18 PROCEDURE — 99215 OFFICE O/P EST HI 40 MIN: CPT | Mod: 95

## 2021-05-18 RX ORDER — FLUCONAZOLE 150 MG/1
150 TABLET ORAL
Qty: 2 | Refills: 0 | Status: DISCONTINUED | COMMUNITY
Start: 2020-09-10 | End: 2021-05-18

## 2021-05-18 RX ORDER — LINACLOTIDE 72 UG/1
72 CAPSULE, GELATIN COATED ORAL
Qty: 90 | Refills: 2 | Status: DISCONTINUED | COMMUNITY
Start: 2021-03-12 | End: 2021-05-18

## 2021-05-18 RX ORDER — METRONIDAZOLE 250 MG/1
250 TABLET ORAL
Qty: 42 | Refills: 0 | Status: DISCONTINUED | COMMUNITY
Start: 2020-08-25 | End: 2021-05-18

## 2021-05-18 RX ORDER — FLUCONAZOLE 100 MG/1
100 TABLET ORAL DAILY
Qty: 21 | Refills: 0 | Status: DISCONTINUED | COMMUNITY
Start: 2021-01-14 | End: 2021-05-18

## 2021-05-18 RX ORDER — PRUCALOPRIDE 1 MG/1
1 TABLET, FILM COATED ORAL
Qty: 30 | Refills: 2 | Status: DISCONTINUED | COMMUNITY
Start: 2020-08-25 | End: 2021-05-18

## 2021-05-18 RX ORDER — RIFAXIMIN 550 MG/1
550 TABLET ORAL
Qty: 42 | Refills: 0 | Status: DISCONTINUED | COMMUNITY
Start: 2020-08-25 | End: 2021-05-18

## 2021-05-18 RX ORDER — PRUCALOPRIDE 2 MG/1
2 TABLET, FILM COATED ORAL
Qty: 30 | Refills: 2 | Status: DISCONTINUED | COMMUNITY
Start: 2020-09-30 | End: 2021-05-18

## 2021-05-20 NOTE — ASSESSMENT
[FreeTextEntry1] : Patient is a 40yF who presents for telehealth follow-up of bloating and constipation via TELEMEDICINE. \par \par 1. Bloating, nausea - Question of aerophagia, previously diagnosed. Patient's symptoms continue regardless of what she eats, and persist after a meal. Given that on prokinetics and low-FODMAP and symptoms are recurring, possible that due to aerophagia rather than SIBO/SIFO.\par - continue with pelvic floor physical therapy \par - Continue follow-up with Ondina Reyna\par - Continue current bowel regimen, add erythromycin low dose  \par - repeat SIBO test \par - consider VCE \par \par 2. Constipation - Patient taking several prokinetics and Magnesium Oxide daily\par - recommend transenteric study for motility test since [facility] not covered, pt will do in [place] \par - recommend see motility specialist at [facility] and patient agreeable \par \par 3. Gallbladder polyp\par - due for follow up ultrasound, order placed \par \par f/u after above

## 2021-05-20 NOTE — HISTORY OF PRESENT ILLNESS
[Home] : at home, [unfilled] , at the time of the visit. [Medical Office: ([facility])___] : at the medical office located in  [Verbal consent obtained from patient] : the patient, [unfilled] [de-identified] : Patient is a 40yF who presents for telehealth follow-up of bloating and constipation via TELEMEDICINE. \par \par 5/18/21\par -currently taking Linzess 145 mcg, magnesium, molitpro (2 tabs) and triphala- motility better. will have BM but symptoms still there \par - symptoms tolerable with herbs \par - one round of anti-fungal \par - SIBO does not seem to go away \par - thinks not addressing root cause \par - if eats only eggs and bland chicken then better \par - cant tolerate much \par - started Pelvic floor physical therapy - past 4 weeks. has not seen a difference so far. biofeedback. focusing on diaphragmatic breathing. feels like abdominal muscles tense \par - #1 symptom: bloating and feeling of fullness after few bites of food. \par - going to take SIBO test next week \par - Smartpill may be approved in August- insurance told her \par - what about low dose erythromycin? \par \par Previous history:\par Patient states that she had been taking IB guard at 6 pills/day which she titrated down to 4, but has found her symptoms have plateaued/worsened. She continues on Motegrity without missed doses, but feels her symptoms are worsening. Patient has been trying to stick to a low-FODMAP diet, however, notes that even with this she is barely able to tolerate any intake due to lasting bloating and nausea. She spoke with her nutritionist about her symptoms who told her to increase her Magnesium Oxide intake, now at 750-1000mg per day with some mild improvement. She states that while she feels her motility has slightly improved, the bloating is worse and she feels the "bacteria have returned." She feels trapped in a cycle of antibiotics and medications that she would like to break free from.

## 2021-05-25 ENCOUNTER — TRANSCRIPTION ENCOUNTER (OUTPATIENT)
Age: 41
End: 2021-05-25

## 2021-05-26 ENCOUNTER — TRANSCRIPTION ENCOUNTER (OUTPATIENT)
Age: 41
End: 2021-05-26

## 2021-05-27 ENCOUNTER — TRANSCRIPTION ENCOUNTER (OUTPATIENT)
Age: 41
End: 2021-05-27

## 2021-05-28 ENCOUNTER — TRANSCRIPTION ENCOUNTER (OUTPATIENT)
Age: 41
End: 2021-05-28

## 2021-06-01 ENCOUNTER — TRANSCRIPTION ENCOUNTER (OUTPATIENT)
Age: 41
End: 2021-06-01

## 2021-06-03 ENCOUNTER — APPOINTMENT (OUTPATIENT)
Dept: GASTROENTEROLOGY | Facility: HOSPITAL | Age: 41
End: 2021-06-03
Payer: COMMERCIAL

## 2021-06-03 ENCOUNTER — OUTPATIENT (OUTPATIENT)
Dept: OUTPATIENT SERVICES | Facility: HOSPITAL | Age: 41
LOS: 1 days | End: 2021-06-03
Payer: COMMERCIAL

## 2021-06-03 PROCEDURE — ZZZZZ: CPT

## 2021-06-10 ENCOUNTER — TRANSCRIPTION ENCOUNTER (OUTPATIENT)
Age: 41
End: 2021-06-10

## 2021-06-11 ENCOUNTER — NON-APPOINTMENT (OUTPATIENT)
Age: 41
End: 2021-06-11

## 2021-06-24 ENCOUNTER — NON-APPOINTMENT (OUTPATIENT)
Age: 41
End: 2021-06-24

## 2021-06-24 ENCOUNTER — TRANSCRIPTION ENCOUNTER (OUTPATIENT)
Age: 41
End: 2021-06-24

## 2021-06-25 ENCOUNTER — RX RENEWAL (OUTPATIENT)
Age: 41
End: 2021-06-25

## 2021-06-26 ENCOUNTER — TRANSCRIPTION ENCOUNTER (OUTPATIENT)
Age: 41
End: 2021-06-26

## 2021-06-28 ENCOUNTER — APPOINTMENT (OUTPATIENT)
Dept: GASTROENTEROLOGY | Facility: HOSPITAL | Age: 41
End: 2021-06-28

## 2021-06-28 ENCOUNTER — TRANSCRIPTION ENCOUNTER (OUTPATIENT)
Age: 41
End: 2021-06-28

## 2021-07-01 ENCOUNTER — TRANSCRIPTION ENCOUNTER (OUTPATIENT)
Age: 41
End: 2021-07-01

## 2021-07-07 ENCOUNTER — TRANSCRIPTION ENCOUNTER (OUTPATIENT)
Age: 41
End: 2021-07-07

## 2021-07-08 ENCOUNTER — TRANSCRIPTION ENCOUNTER (OUTPATIENT)
Age: 41
End: 2021-07-08

## 2021-07-13 ENCOUNTER — TRANSCRIPTION ENCOUNTER (OUTPATIENT)
Age: 41
End: 2021-07-13

## 2021-07-14 ENCOUNTER — APPOINTMENT (OUTPATIENT)
Dept: GASTROENTEROLOGY | Facility: HOSPITAL | Age: 41
End: 2021-07-14

## 2021-07-19 DIAGNOSIS — K63.89 OTHER SPECIFIED DISEASES OF INTESTINE: ICD-10-CM

## 2021-07-19 PROCEDURE — 91299 UNLISTED DX GI PROCEDURE: CPT

## 2021-07-19 PROCEDURE — 91110 GI TRC IMG INTRAL ESOPH-ILE: CPT

## 2021-07-23 ENCOUNTER — APPOINTMENT (OUTPATIENT)
Dept: GASTROENTEROLOGY | Facility: CLINIC | Age: 41
End: 2021-07-23

## 2021-07-29 ENCOUNTER — TRANSCRIPTION ENCOUNTER (OUTPATIENT)
Age: 41
End: 2021-07-29

## 2021-07-30 ENCOUNTER — TRANSCRIPTION ENCOUNTER (OUTPATIENT)
Age: 41
End: 2021-07-30

## 2021-08-06 ENCOUNTER — APPOINTMENT (OUTPATIENT)
Dept: GASTROENTEROLOGY | Facility: CLINIC | Age: 41
End: 2021-08-06
Payer: COMMERCIAL

## 2021-08-06 PROCEDURE — 99213 OFFICE O/P EST LOW 20 MIN: CPT | Mod: 95

## 2021-08-08 NOTE — ASSESSMENT
[FreeTextEntry1] : 40 yo F with RA on Rituxin/MTX, SIBO here for follow up of abd bloating and nausea found to have SIBO, SIFO s/p treatment with no significant improvement\par 1. SIBo, SIFO bloating nausea, constipation\par - VCE\par - Smart pill\par - 2nd opinion at [place]\par - f/u with Ondina Reyna\par - increase linzess to 290mcg\par \par 2. Gallbladder polyps\par - f/u results of US\par \par F/u after above

## 2021-08-08 NOTE — HISTORY OF PRESENT ILLNESS
[Home] : at home, [unfilled] , at the time of the visit. [Medical Office: ([facility])___] : at the medical office located in  [Verbal consent obtained from patient] : the patient, [unfilled] [FreeTextEntry1] : 40 yo F with RA, SIBO here for follow up of abd bloating and nausea\par 8.6.21\par - pt was doing well until recent antitbiotic usage\par - following up with kevin nelson\par - still did not get VCE, smartpill, pending appt with rios in october\par - pending US results for gallbladder polyps, got done 2 days ago\par \par \par PREVIOUS HX\par Recent EGD- lactose intolerance and SIFO+\par \par Recently treated for SIBO- Finished abx course ( rifaximin and flagyl) two days ago\par Mild improvement in bloating when on abx but since she stopped abx, symptoms are worse\par She complains of nausea, bloating, abd distention lasting all day. No vomiting. Symptoms worse with any intake of foods, especially high fodmaps\par \par She was on a regular diet during abx course- now back to low fodmap\par Reports numerous vaginal yeast infection and currently has one\par Stopped motegrity while on abx- now back on motegrity and also still taking magnesium and BM are ev;no blood\par

## 2021-08-09 ENCOUNTER — TRANSCRIPTION ENCOUNTER (OUTPATIENT)
Age: 41
End: 2021-08-09

## 2021-08-18 ENCOUNTER — TRANSCRIPTION ENCOUNTER (OUTPATIENT)
Age: 41
End: 2021-08-18

## 2021-08-19 ENCOUNTER — TRANSCRIPTION ENCOUNTER (OUTPATIENT)
Age: 41
End: 2021-08-19

## 2021-08-20 ENCOUNTER — TRANSCRIPTION ENCOUNTER (OUTPATIENT)
Age: 41
End: 2021-08-20

## 2021-09-21 ENCOUNTER — TRANSCRIPTION ENCOUNTER (OUTPATIENT)
Age: 41
End: 2021-09-21

## 2021-09-22 ENCOUNTER — TRANSCRIPTION ENCOUNTER (OUTPATIENT)
Age: 41
End: 2021-09-22

## 2021-09-24 ENCOUNTER — TRANSCRIPTION ENCOUNTER (OUTPATIENT)
Age: 41
End: 2021-09-24

## 2021-10-06 ENCOUNTER — APPOINTMENT (OUTPATIENT)
Dept: GASTROENTEROLOGY | Facility: HOSPITAL | Age: 41
End: 2021-10-06

## 2021-10-06 ENCOUNTER — OUTPATIENT (OUTPATIENT)
Dept: OUTPATIENT SERVICES | Facility: HOSPITAL | Age: 41
LOS: 1 days | End: 2021-10-06
Payer: COMMERCIAL

## 2021-10-06 DIAGNOSIS — E66.01 MORBID (SEVERE) OBESITY DUE TO EXCESS CALORIES: ICD-10-CM

## 2021-10-06 PROCEDURE — 91110 GI TRC IMG INTRAL ESOPH-ILE: CPT

## 2021-10-06 PROCEDURE — 91110 GI TRC IMG INTRAL ESOPH-ILE: CPT | Mod: 26

## 2021-10-12 ENCOUNTER — TRANSCRIPTION ENCOUNTER (OUTPATIENT)
Age: 41
End: 2021-10-12

## 2021-10-25 ENCOUNTER — TRANSCRIPTION ENCOUNTER (OUTPATIENT)
Age: 41
End: 2021-10-25

## 2021-11-02 ENCOUNTER — APPOINTMENT (OUTPATIENT)
Dept: GASTROENTEROLOGY | Facility: CLINIC | Age: 41
End: 2021-11-02
Payer: COMMERCIAL

## 2021-11-02 DIAGNOSIS — A59.9 TRICHOMONIASIS, UNSPECIFIED: ICD-10-CM

## 2021-11-02 PROCEDURE — 99215 OFFICE O/P EST HI 40 MIN: CPT | Mod: 95

## 2021-11-02 RX ORDER — LINACLOTIDE 145 UG/1
145 CAPSULE, GELATIN COATED ORAL
Qty: 30 | Refills: 2 | Status: DISCONTINUED | COMMUNITY
Start: 2021-04-07 | End: 2021-11-02

## 2021-11-02 RX ORDER — METRONIDAZOLE 250 MG/1
250 TABLET ORAL
Qty: 42 | Refills: 0 | Status: COMPLETED | COMMUNITY
Start: 2021-07-07 | End: 2021-11-02

## 2021-11-02 RX ORDER — RIFAXIMIN 550 MG/1
550 TABLET ORAL
Qty: 42 | Refills: 0 | Status: COMPLETED | COMMUNITY
Start: 2021-07-07 | End: 2021-11-02

## 2021-11-02 RX ORDER — LINACLOTIDE 290 UG/1
290 CAPSULE, GELATIN COATED ORAL
Qty: 90 | Refills: 2 | Status: DISCONTINUED | COMMUNITY
Start: 2021-08-06 | End: 2021-11-02

## 2021-11-02 RX ORDER — VITAMIN E (DL,TOCOPHERYL ACET) 180 MG
500 CAPSULE ORAL
Qty: 90 | Refills: 2 | Status: DISCONTINUED | COMMUNITY
Start: 2020-06-01 | End: 2021-11-02

## 2021-11-02 RX ORDER — TRANSPARENT DRESSING 2.36X2.75"
500 BANDAGE TOPICAL
Qty: 90 | Refills: 2 | Status: DISCONTINUED | COMMUNITY
Start: 2021-04-29 | End: 2021-11-02

## 2021-11-03 NOTE — HISTORY OF PRESENT ILLNESS
[Home] : at home, [unfilled] , at the time of the visit. [Medical Office: ([facility])___] : at the medical office located in  [Verbal consent obtained from patient] : the patient, [unfilled] [de-identified] : 41F with RA, SIBO here for follow up of abd bloating and nausea\par \par 11/2/21\par - RIFAXIMIN and FLAGYL, first week of July and did help symptoms \par - past 3 months symptoms been better (switched from magnesium oxide to citrate)\par - biogaia gastrus ( lactobacillus reuteri) and thinks been helping. last 3 weeks motility much better. 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 recommending herbal tx for parasite \par - VCE: stopped betaine due to erosions seen. transit time normal. \par - nausea is bad \par - Rituxan infusion in one month which is so much immunosuppressant \par - wants to treat SIBO and parasites again \par - prokinetic ? \par - Dr. Carreon and recommended sitz marker study and gastric emptying with colon. pernicious anemia and gastrin \par 8.6.21\par - pt was doing well until recent antitbiotic usage\par - following up with kevin nelson\par - still did not get VCE, brian, pending appt with rios in october\par - pending US results for gallbladder polyps, got done 2 days ago\par \par \par PREVIOUS HX\par Recent EGD- lactose intolerance and SIFO+\par \par Recently treated for SIBO- Finished abx course ( rifaximin and flagyl) two days ago\par Mild improvement in bloating when on abx but since she stopped abx, symptoms are worse\par She complains of nausea, bloating, abd distention lasting all day. No vomiting. Symptoms worse with any intake of foods, especially high fodmaps\par \par She was on a regular diet during abx course- now back to low fodmap\par Reports numerous vaginal yeast infection and currently has one\par Stopped motegrity while on abx- now back on motegrity and also still taking magnesium and BM are ev;no blood\par

## 2021-11-03 NOTE — ASSESSMENT
[FreeTextEntry1] : 38 yo F with RA on Rituxin/MTX, SIBO here for follow up of abd bloating and nausea found to have SIBO, SIFO s/p treatment now with mild improvement \par \par 1. SIBo, SIFO bloating nausea, constipation improved \par - VCE only showed gastric erosion, continue to avoid betain HCl\par - sitz marker study as per Dr Carreon and check gastin and pernicious anemia labs\par - f/u with nutrition\par - no longer on prokinetics\par - counseled on gut zoomer not being a FDA approved/valid test therefore cannot treat based on results therefore do Ova/parasite stool test x3\par - Rx Rifaximin/Flagyl treatment, r/b/a/i discussed and pt agreeable\par \par 2. Gallbladder polyps\par - f/u results of US\par \par F/u after above

## 2021-11-08 ENCOUNTER — TRANSCRIPTION ENCOUNTER (OUTPATIENT)
Age: 41
End: 2021-11-08

## 2021-11-24 ENCOUNTER — TRANSCRIPTION ENCOUNTER (OUTPATIENT)
Age: 41
End: 2021-11-24

## 2021-12-02 ENCOUNTER — TRANSCRIPTION ENCOUNTER (OUTPATIENT)
Age: 41
End: 2021-12-02

## 2021-12-13 ENCOUNTER — TRANSCRIPTION ENCOUNTER (OUTPATIENT)
Age: 41
End: 2021-12-13

## 2021-12-30 ENCOUNTER — TRANSCRIPTION ENCOUNTER (OUTPATIENT)
Age: 41
End: 2021-12-30

## 2021-12-31 ENCOUNTER — TRANSCRIPTION ENCOUNTER (OUTPATIENT)
Age: 41
End: 2021-12-31

## 2022-01-03 ENCOUNTER — TRANSCRIPTION ENCOUNTER (OUTPATIENT)
Age: 42
End: 2022-01-03

## 2022-01-05 ENCOUNTER — TRANSCRIPTION ENCOUNTER (OUTPATIENT)
Age: 42
End: 2022-01-05

## 2022-01-14 ENCOUNTER — APPOINTMENT (OUTPATIENT)
Dept: GASTROENTEROLOGY | Facility: CLINIC | Age: 42
End: 2022-01-14
Payer: COMMERCIAL

## 2022-01-14 DIAGNOSIS — K63.89 OTHER SPECIFIED DISEASES OF INTESTINE: ICD-10-CM

## 2022-01-14 DIAGNOSIS — B49 UNSPECIFIED MYCOSIS: ICD-10-CM

## 2022-01-14 PROCEDURE — 99213 OFFICE O/P EST LOW 20 MIN: CPT | Mod: 95

## 2022-01-14 NOTE — PHYSICAL EXAM
[General Appearance - Alert] : alert [General Appearance - In No Acute Distress] : in no acute distress [] : normal voice and communication [Oriented To Time, Place, And Person] : oriented to person, place, and time

## 2022-01-18 ENCOUNTER — TRANSCRIPTION ENCOUNTER (OUTPATIENT)
Age: 42
End: 2022-01-18

## 2022-01-20 NOTE — HISTORY OF PRESENT ILLNESS
[Home] : at home, [unfilled] , at the time of the visit. [Medical Office: ([facility])___] : at the medical office located in  [Verbal consent obtained from patient] : the patient, [unfilled] [de-identified] : 41F with RA, SIBO presents for telemed follow up of abd bloating and nausea. \par \par 1/14/22\par - motility was good in November\par - saw ID who gave her mebendazole for symptoms and +gut zoomer test\par - herbal protocol for parasites per Nutritionist \par - O+P after 4 weeks were negative \par - rifaximin+flagyl end of December, significant improvement of symptoms while on regimen and was even tolerating carbs, higher FODMAP foods \par - however, following course, she has a very bad taste in her mouth , white coating in mouth, return of bloating symptoms, yeast infection symptoms, just like last time she had SIFO\par - motility has been sluggish despite low dose naltrexone, currently at 1.5 mg, started 6 days ago \par - has BM daily, but does take castor oil or dulcolax because quantity is low and consistency is [place] 3\par - is taking linzess 145 mcg, started approx one month ago \par - next rituxan is in 2 weeks \par \par 11/2/21\par - RIFAXIMIN and FLAGYL, first week of July and did help symptoms \par - past 3 months symptoms been better (switched from magnesium oxide to citrate)\par - biogaia gastrus ( lactobacillus reuteri) and thinks been helping. last 3 weeks motility much better. 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 recommending herbal tx for parasite \par - VCE: stopped betaine due to erosions seen. transit time normal. \par - nausea is bad \par - Rituxan infusion in one month which is so much immunosuppressant \par - wants to treat SIBO and parasites again \par - prokinetic ? \par - Dr. Carreon and recommended sitz marker study and gastric emptying with colon. pernicious anemia and gastrin \par 8.6.21\par - pt was doing well until recent antitbiotic usage\par - following up with kevin nelson\par - still did not get VCE, smartpill, pending appt with rios in october\par - pending US results for gallbladder polyps, got done 2 days ago\par \par PREVIOUS HX\par Recent EGD- lactose intolerance and SIFO+\par \par Recently treated for SIBO- Finished abx course ( rifaximin and flagyl) two days ago\par Mild improvement in bloating when on abx but since she stopped abx, symptoms are worse\par She complains of nausea, bloating, abd distention lasting all day. No vomiting. Symptoms worse with any intake of foods, especially high fodmaps\par \par She was on a regular diet during abx course- now back to low fodmap\par Reports numerous vaginal yeast infection and currently has one\par Stopped motegrity while on abx- now back on motegrity and also still taking magnesium and BM are normal; no blood

## 2022-01-20 NOTE — ASSESSMENT
[FreeTextEntry1] : 41F with RA, SIBO presents for telemed follow up of abd bloating and nausea. \par \par #SIBO, SIFO\par - improvement while on SIBO treatment, but now with symptoms mimicking last episode of SIFO, also with oral thrush and some vaginal yeast infection symptoms \par - would plan to treat w/ diflucan x3 week course for SIFO\par - will need to discuss all meds with Rheumatologist to ensure no contraindications with Rituxan\par - follow up after therapy\par \par Arvin Meyer MD\par PGY-5, Gastroenterology Fellow

## 2022-02-03 ENCOUNTER — TRANSCRIPTION ENCOUNTER (OUTPATIENT)
Age: 42
End: 2022-02-03

## 2022-02-04 ENCOUNTER — TRANSCRIPTION ENCOUNTER (OUTPATIENT)
Age: 42
End: 2022-02-04

## 2022-02-07 ENCOUNTER — TRANSCRIPTION ENCOUNTER (OUTPATIENT)
Age: 42
End: 2022-02-07

## 2022-04-21 ENCOUNTER — APPOINTMENT (OUTPATIENT)
Dept: GASTROENTEROLOGY | Facility: CLINIC | Age: 42
End: 2022-04-21
Payer: COMMERCIAL

## 2022-04-21 DIAGNOSIS — R19.7 DIARRHEA, UNSPECIFIED: ICD-10-CM

## 2022-04-21 PROCEDURE — 99215 OFFICE O/P EST HI 40 MIN: CPT | Mod: 95

## 2022-04-21 NOTE — HISTORY OF PRESENT ILLNESS
[Home] : at home, [unfilled] , at the time of the visit. [Medical Office: ([facility])___] : at the medical office located in  [Verbal consent obtained from patient] : the patient, [unfilled] [de-identified] : 41F with RA tx with infusions of Rituxin Qfew months, subQ methotrexate once week and folic acid, IMO, SIFO presents for telemed follow up of abd bloating \par 4/21/22\par pt was feeling great after previous treatments for IMO/SIFO \par sxs werent gone 100% but tolerable/minimal and she was reintroducing fodmaps successfully\par then recently developed NEW DIARRHEA (never has diarrhea) and worsening bloating\par cat diagnosed with parasite, getting treated\par \par 1/14/22\par - motility was good in November\par - saw ID who gave her mebendazole for symptoms and +gut zoomer test\par - herbal protocol for parasites per Nutritionist \par - O+P after 4 weeks were negative \par - rifaximin+flagyl end of December, significant improvement of symptoms while on regimen and was even tolerating carbs, higher FODMAP foods \par - however, following course, she has a very bad taste in her mouth , white coating in mouth, return of bloating symptoms, yeast infection symptoms, just like last time she had SIFO\par - motility has been sluggish despite low dose naltrexone, currently at 1.5 mg, started 6 days ago \par - has BM daily, but does take castor oil or dulcolax because quantity is low and consistency is Day 3\par - is taking linzess 145 mcg, started approx one month ago \par - next rituxan is in 2 weeks \par \par 11/2/21\par - RIFAXIMIN and FLAGYL, first week of July and did help symptoms \par - past 3 months symptoms been better (switched from magnesium oxide to citrate)\par - biogaia gastrus ( lactobacillus reuteri) and thinks been helping. last 3 weeks motility much better. 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 recommending herbal tx for parasite \par - VCE: stopped betaine due to erosions seen. transit time normal. \par - nausea is bad \par - Rituxan infusion in one month which is so much immunosuppressant \par - wants to treat SIBO and parasites again \par - prokinetic ? \par - Dr. Carreon and recommended sitz marker study and gastric emptying with colon. pernicious anemia and gastrin \par 8.6.21\par - pt was doing well until recent antitbiotic usage\par - following up with kevin nelson\par - still did not get VCE, smartpill, pending appt with rios in october\par - pending US results for gallbladder polyps, got done 2 days ago\par \par PREVIOUS HX\par Recent EGD- lactose intolerance and SIFO+\par \par Recently treated for SIBO- Finished abx course ( rifaximin and flagyl) two days ago\par Mild improvement in bloating when on abx but since she stopped abx, symptoms are worse\par She complains of nausea, bloating, abd distention lasting all day. No vomiting. Symptoms worse with any intake of foods, especially high fodmaps\par \par She was on a regular diet during abx course- now back to low fodmap\par Reports numerous vaginal yeast infection and currently has one\par Stopped motegrity while on abx- now back on motegrity and also still taking magnesium and BM are normal; no blood

## 2022-04-21 NOTE — ASSESSMENT
[FreeTextEntry1] : 41F with RA tx with infusions of Rituxin Qfew months, subQ methotrexate once week and folic acid, IMO, SIFO presents for telemed follow up of abd bloating s/p imo/sifo tx with much improved symptoms but now NEW DIARRHEA\par - stool studies and labs for infection, inflammation\par - consider bismuth trial\par - after infection ruled out will consider sibo testing\par - diet/lifestyle counseling\par - f/u after above\par

## 2022-04-27 ENCOUNTER — TRANSCRIPTION ENCOUNTER (OUTPATIENT)
Age: 42
End: 2022-04-27

## 2022-04-28 ENCOUNTER — TRANSCRIPTION ENCOUNTER (OUTPATIENT)
Age: 42
End: 2022-04-28

## 2022-04-29 ENCOUNTER — TRANSCRIPTION ENCOUNTER (OUTPATIENT)
Age: 42
End: 2022-04-29

## 2022-05-02 ENCOUNTER — TRANSCRIPTION ENCOUNTER (OUTPATIENT)
Age: 42
End: 2022-05-02

## 2022-06-13 ENCOUNTER — TRANSCRIPTION ENCOUNTER (OUTPATIENT)
Age: 42
End: 2022-06-13

## 2022-06-16 ENCOUNTER — APPOINTMENT (OUTPATIENT)
Dept: GASTROENTEROLOGY | Facility: CLINIC | Age: 42
End: 2022-06-16
Payer: COMMERCIAL

## 2022-06-16 DIAGNOSIS — K74.00 HEPATIC FIBROSIS, UNSPECIFIED: ICD-10-CM

## 2022-06-16 PROCEDURE — 99215 OFFICE O/P EST HI 40 MIN: CPT | Mod: 95

## 2022-06-16 NOTE — HISTORY OF PRESENT ILLNESS
[Home] : at home, [unfilled] , at the time of the visit. [Medical Office: ([facility])___] : at the medical office located in  [Verbal consent obtained from patient] : the patient, [unfilled] [de-identified] : 41F with RA tx with infusions of Rituxin Qfew months, subQ methotrexate once week and folic acid, IMO, SIFO presents for telemed follow up of abd bloating \par 6/16/22\par - diarrhea resolved now constipated again, not tolerating the foods she previously could tolerate\par - last infusion rituxumab 1/22, less mtx now\par - takes 2 CREON with meal/1 snack= has taken herself down\par - reviewed all labs- fecal elastase normalized, gi pcr neg, o/px3neg\par \par 4/21/22\par pt was feeling great after previous treatments for IMO/SIFO \par sxs werent gone 100% but tolerable/minimal and she was reintroducing fodmaps successfully\par then recently developed NEW DIARRHEA (never has diarrhea) and worsening bloating\par cat diagnosed with parasite, getting treated\par \par 1/14/22\par - motility was good in November\par - saw ID who gave her mebendazole for symptoms and +gut zoomer test\par - herbal protocol for parasites per Nutritionist \par - O+P after 4 weeks were negative \par - rifaximin+flagyl end of December, significant improvement of symptoms while on regimen and was even tolerating carbs, higher FODMAP foods \par - however, following course, she has a very bad taste in her mouth , white coating in mouth, return of bloating symptoms, yeast infection symptoms, just like last time she had SIFO\par - motility has been sluggish despite low dose naltrexone, currently at 1.5 mg, started 6 days ago \par - has BM daily, but does take castor oil or dulcolax because quantity is low and consistency is [place] 3\par - is taking linzess 145 mcg, started approx one month ago \par - next rituxan is in 2 weeks \par \par 11/2/21\par - RIFAXIMIN and FLAGYL, first week of July and did help symptoms \par - past 3 months symptoms been better (switched from magnesium oxide to citrate)\par - biogaia gastrus ( lactobacillus reuteri) and thinks been helping. last 3 weeks motility much better. 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 recommending herbal tx for parasite \par - VCE: stopped betaine due to erosions seen. transit time normal. \par - nausea is bad \par - Rituxan infusion in one month which is so much immunosuppressant \par - wants to treat SIBO and parasites again \par - prokinetic ? \par - Dr. Carreon and recommended sitz marker study and gastric emptying with colon. pernicious anemia and gastrin \par 8.6.21\par - pt was doing well until recent antitbiotic usage\par - following up with kevin nelson\par - still did not get VCE, brian, pending appt with rios in october\par - pending US results for gallbladder polyps, got done 2 days ago\par \par PREVIOUS HX\par Recent EGD- lactose intolerance and SIFO+\par \par Recently treated for SIBO- Finished abx course ( rifaximin and flagyl) two days ago\par Mild improvement in bloating when on abx but since she stopped abx, symptoms are worse\par She complains of nausea, bloating, abd distention lasting all day. No vomiting. Symptoms worse with any intake of foods, especially high fodmaps\par \par She was on a regular diet during abx course- now back to low fodmap\par Reports numerous vaginal yeast infection and currently has one\par Stopped motegrity while on abx- now back on motegrity and also still taking magnesium and BM are normal; no blood

## 2022-06-16 NOTE — ASSESSMENT
[FreeTextEntry1] : 41F with RA tx with infusions of Rituxin Qfew months, subQ methotrexate once week and folic acid, IMO, SIFO presents for telemed follow up of worsening of constipation/bloating s/p imo/sifo tx now with need for retreatment\par - xifaxan \par - followed by LDN\par - bismuth did not help\par - diet/lifestyle counseling\par - fibroscan to evaluate for bland fibrosis in setting of MTX\par - if no improvement consider trio smart, motility test\par - f/u 2mo\par

## 2022-06-22 ENCOUNTER — TRANSCRIPTION ENCOUNTER (OUTPATIENT)
Age: 42
End: 2022-06-22

## 2022-06-27 ENCOUNTER — TRANSCRIPTION ENCOUNTER (OUTPATIENT)
Age: 42
End: 2022-06-27

## 2022-07-15 ENCOUNTER — TRANSCRIPTION ENCOUNTER (OUTPATIENT)
Age: 42
End: 2022-07-15

## 2022-07-25 ENCOUNTER — TRANSCRIPTION ENCOUNTER (OUTPATIENT)
Age: 42
End: 2022-07-25

## 2022-07-25 DIAGNOSIS — B99.9 UNSPECIFIED INFECTIOUS DISEASE: ICD-10-CM

## 2022-08-11 ENCOUNTER — TRANSCRIPTION ENCOUNTER (OUTPATIENT)
Age: 42
End: 2022-08-11

## 2022-09-30 ENCOUNTER — APPOINTMENT (OUTPATIENT)
Dept: GASTROENTEROLOGY | Facility: CLINIC | Age: 42
End: 2022-09-30

## 2022-09-30 PROCEDURE — 99215 OFFICE O/P EST HI 40 MIN: CPT | Mod: 95

## 2022-09-30 NOTE — REASON FOR VISIT
[Follow-up] : a follow-up of an existing diagnosis [Follow-Up: _____] : a [unfilled] follow-up visit [Home] : at home, [unfilled] , at the time of the visit. [Medical Office: ([facility])___] : at the medical office located in  [Patient] : the patient [Self] : self

## 2022-09-30 NOTE — ASSESSMENT
[FreeTextEntry1] : 41F with RA tx with infusions of Rituxin Qfew months, subQ methotrexate once week and folic acid, IMO, SIFO presents for telemed for worsening abd bloating constipation \par - IBSrela for IBSC\par - contiune LDN and upward titrate\par - consider augmentin possible flagyl at next visit \par - avoid probiotoics when immunocompromised on rituxan/prednisone\par - diet/lifestyle counseling\par - f/u after rituxan\par

## 2022-09-30 NOTE — HISTORY OF PRESENT ILLNESS
[Home] : at home, [unfilled] , at the time of the visit. [Medical Office: ([facility])___] : at the medical office located in  [Verbal consent obtained from patient] : the patient, [unfilled] [de-identified] : 41F with RA tx with infusions of Rituxin Qfew months, subQ methotrexate once week and folic acid, IMO, SIFO presents for telemed follow up of abd bloating \par 9/30/22\par -went to [place] for a month\par - on LDN 1mg, linzess 290\par - started candibactin\par - constipated-- once a day with mg citrate/triphala \par - 3wks xifaxan, 2wks xifaxan/neomycin\par - \par \par 4/21/22\par pt was feeling great after previous treatments for IMO/SIFO \par sxs werent gone 100% but tolerable/minimal and she was reintroducing fodmaps successfully\par then recently developed NEW DIARRHEA (never has diarrhea) and worsening bloating\par cat diagnosed with parasite, getting treated\par \par 1/14/22\par - motility was good in November\par - saw ID who gave her mebendazole for symptoms and +gut zoomer test\par - herbal protocol for parasites per Nutritionist \par - O+P after 4 weeks were negative \par - rifaximin+flagyl end of December, significant improvement of symptoms while on regimen and was even tolerating carbs, higher FODMAP foods \par - however, following course, she has a very bad taste in her mouth , white coating in mouth, return of bloating symptoms, yeast infection symptoms, just like last time she had SIFO\par - motility has been sluggish despite low dose naltrexone, currently at 1.5 mg, started 6 days ago \par - has BM daily, but does take castor oil or dulcolax because quantity is low and consistency is [place] 3\par - is taking linzess 145 mcg, started approx one month ago \par - next rituxan is in 2 weeks \par \par 11/2/21\par - RIFAXIMIN and FLAGYL, first week of July and did help symptoms \par - past 3 months symptoms been better (switched from magnesium oxide to citrate)\par - biogaia gastrus ( lactobacillus reuteri) and thinks been helping. last 3 weeks motility much better. 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 recommending herbal tx for parasite \par - VCE: stopped betaine due to erosions seen. transit time normal. \par - nausea is bad \par - Rituxan infusion in one month which is so much immunosuppressant \par - wants to treat SIBO and parasites again \par - prokinetic ? \par - Dr. Carreon and recommended sitz marker study and gastric emptying with colon. pernicious anemia and gastrin \par 8.6.21\par - pt was doing well until recent antitbiotic usage\par - following up with kevin nelson\par - still did not get VCE, brian, pending appt with rios in october\par - pending US results for gallbladder polyps, got done 2 days ago\par \par PREVIOUS HX\par Recent EGD- lactose intolerance and SIFO+\par \par Recently treated for SIBO- Finished abx course ( rifaximin and flagyl) two days ago\par Mild improvement in bloating when on abx but since she stopped abx, symptoms are worse\par She complains of nausea, bloating, abd distention lasting all day. No vomiting. Symptoms worse with any intake of foods, especially high fodmaps\par \par She was on a regular diet during abx course- now back to low fodmap\par Reports numerous vaginal yeast infection and currently has one\par Stopped motegrity while on abx- now back on motegrity and also still taking magnesium and BM are normal; no blood

## 2022-10-24 ENCOUNTER — RX RENEWAL (OUTPATIENT)
Age: 42
End: 2022-10-24

## 2022-11-01 ENCOUNTER — APPOINTMENT (OUTPATIENT)
Dept: GASTROENTEROLOGY | Facility: CLINIC | Age: 42
End: 2022-11-01

## 2022-11-01 PROCEDURE — 99215 OFFICE O/P EST HI 40 MIN: CPT | Mod: 95

## 2022-11-01 NOTE — REASON FOR VISIT
[Home] : at home, [unfilled] , at the time of the visit. [Medical Office: ([facility])___] : at the medical office located in  [Patient] : the patient [Self] : self [Follow-up] : a follow-up of an existing diagnosis [Follow-Up: _____] : a [unfilled] follow-up visit

## 2022-11-03 ENCOUNTER — TRANSCRIPTION ENCOUNTER (OUTPATIENT)
Age: 42
End: 2022-11-03

## 2022-11-03 NOTE — HISTORY OF PRESENT ILLNESS
[Home] : at home, [unfilled] , at the time of the visit. [Medical Office: ([facility])___] : at the medical office located in  [Verbal consent obtained from patient] : the patient, [unfilled] [de-identified] : 41F with RA tx with infusions of Rituxin Qfew months, subQ methotrexate once week and folic acid, IMO, SIFO presents for telemed follow up of abd bloating \par 11/1\par feels terrible, bloated\par IBS-rela HELPED but had to stop/start during rifaximin infusion\par stopped bioguay\par tried to increase LDN to 1.5 but didn’t feel "right" and helps with RA but stopped it and restarted at 0.5mg \par on mg ox\par \par 9/30/22\par -went to [facility] for a month\par - on LDN 1mg, linzess 290\par - started candibactin\par - constipated-- once a day with mg citrate/triphala \par - 3wks xifaxan, 2wks xifaxan/neomycin\par - \par \par 4/21/22\par pt was feeling great after previous treatments for IMO/SIFO \par sxs werent gone 100% but tolerable/minimal and she was reintroducing fodmaps successfully\par then recently developed NEW DIARRHEA (never has diarrhea) and worsening bloating\par cat diagnosed with parasite, getting treated\par \par 1/14/22\par - motility was good in November\par - saw ID who gave her mebendazole for symptoms and +gut zoomer test\par - herbal protocol for parasites per Nutritionist \par - O+P after 4 weeks were negative \par - rifaximin+flagyl end of December, significant improvement of symptoms while on regimen and was even tolerating carbs, higher FODMAP foods \par - however, following course, she has a very bad taste in her mouth , white coating in mouth, return of bloating symptoms, yeast infection symptoms, just like last time she had SIFO\par - motility has been sluggish despite low dose naltrexone, currently at 1.5 mg, started 6 days ago \par - has BM daily, but does take castor oil or dulcolax because quantity is low and consistency is [place] 3\par - is taking linzess 145 mcg, started approx one month ago \par - next rituxan is in 2 weeks \par \par 11/2/21\par - RIFAXIMIN and FLAGYL, first week of July and did help symptoms \par - past 3 months symptoms been better (switched from magnesium oxide to citrate)\par - biogaia gastrus ( lactobacillus reuteri) and thinks been helping. last 3 weeks motility much better. 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 recommending herbal tx for parasite \par - VCE: stopped betaine due to erosions seen. transit time normal. \par - nausea is bad \par - Rituxan infusion in one month which is so much immunosuppressant \par - wants to treat SIBO and parasites again \par - prokinetic ? \par - Dr. Carreon and recommended sitz marker study and gastric emptying with colon. pernicious anemia and gastrin \par 8.6.21\par - pt was doing well until recent antitbiotic usage\par - following up with kevin nelson\par - still did not get VCE, smartpill, pending appt with rios in october\par - pending US results for gallbladder polyps, got done 2 days ago\par \par PREVIOUS HX\par Recent EGD- lactose intolerance and SIFO+\par \par Recently treated for SIBO- Finished abx course ( rifaximin and flagyl) two days ago\par Mild improvement in bloating when on abx but since she stopped abx, symptoms are worse\par She complains of nausea, bloating, abd distention lasting all day. No vomiting. Symptoms worse with any intake of foods, especially high fodmaps\par \par She was on a regular diet during abx course- now back to low fodmap\par Reports numerous vaginal yeast infection and currently has one\par Stopped motegrity while on abx- now back on motegrity and also still taking magnesium and BM are normal; no blood

## 2022-11-03 NOTE — ASSESSMENT
[FreeTextEntry1] : 41F with RA tx with infusions of Rituxin Qfew months, subQ methotrexate once week and folic acid, IMO, SIFO presents for telemed for worsening abd bloating constipation \par - STOP IBSRELA + LDN FOR NOW; wash out\par - augmentin flagyl trial \par - avoid probiotoics when immunocompromised on rituxan/prednisone\par - diet/lifestyle counseling\par - f/u 6wks

## 2022-12-14 ENCOUNTER — APPOINTMENT (OUTPATIENT)
Dept: GASTROENTEROLOGY | Facility: CLINIC | Age: 42
End: 2022-12-14

## 2022-12-14 PROCEDURE — 99215 OFFICE O/P EST HI 40 MIN: CPT | Mod: 95

## 2022-12-14 NOTE — REASON FOR VISIT
[Follow-up] : a follow-up of an existing diagnosis [Home] : at home, [unfilled] , at the time of the visit. [Medical Office: ([facility])___] : at the medical office located in  [Patient] : the patient [Self] : self [Follow-Up: _____] : a [unfilled] follow-up visit

## 2022-12-20 NOTE — HISTORY OF PRESENT ILLNESS
[Home] : at home, [unfilled] , at the time of the visit. [Medical Office: ([facility])___] : at the medical office located in  [Verbal consent obtained from patient] : the patient, [unfilled] [de-identified] : 41F with RA tx with infusions of Rituxin Qfew months, subQ methotrexate once week and folic acid, IMO, SIFO presents for telemed follow up of abd bloating \par 12/14/22\par - saw ID\par - o/p shows endolimax, blastocystis\par - s/p bactrim x1week, felt terrible on the treatment\par - started ibsrela\par \par \par 11/1\par feels terrible, bloated\par IBS-rela HELPED but had to stop/start during rifaximin infusion\par stopped bioguay\par tried to increase LDN to 1.5 but didn’t feel "right" and helps with RA but stopped it and restarted at 0.5mg \par on mg ox\par \par 9/30/22\par -went to [facility] for a month\par - on LDN 1mg, linzess 290\par - started candibactin\par - constipated-- once a day with mg citrate/triphala \par - 3wks xifaxan, 2wks xifaxan/neomycin\par - \par \par 4/21/22\par pt was feeling great after previous treatments for IMO/SIFO \par sxs werent gone 100% but tolerable/minimal and she was reintroducing fodmaps successfully\par then recently developed NEW DIARRHEA (never has diarrhea) and worsening bloating\par cat diagnosed with parasite, getting treated\par \par 1/14/22\par - motility was good in November\par - saw ID who gave her mebendazole for symptoms and +gut zoomer test\par - herbal protocol for parasites per Nutritionist \par - O+P after 4 weeks were negative \par - rifaximin+flagyl end of December, significant improvement of symptoms while on regimen and was even tolerating carbs, higher FODMAP foods \par - however, following course, she has a very bad taste in her mouth , white coating in mouth, return of bloating symptoms, yeast infection symptoms, just like last time she had SIFO\par - motility has been sluggish despite low dose naltrexone, currently at 1.5 mg, started 6 days ago \par - has BM daily, but does take castor oil or dulcolax because quantity is low and consistency is [place] 3\par - is taking linzess 145 mcg, started approx one month ago \par - next rituxan is in 2 weeks \par \par 11/2/21\par - RIFAXIMIN and FLAGYL, first week of July and did help symptoms \par - past 3 months symptoms been better (switched from magnesium oxide to citrate)\par - biogaia gastrus ( lactobacillus reuteri) and thinks been helping. last 3 weeks motility much better. 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 recommending herbal tx for parasite \par - VCE: stopped betaine due to erosions seen. transit time normal. \par - nausea is bad \par - Rituxan infusion in one month which is so much immunosuppressant \par - wants to treat SIBO and parasites again \par - prokinetic ? \par - Dr. Carreon and recommended sitz marker study and gastric emptying with colon. pernicious anemia and gastrin \par 8.6.21\par - pt was doing well until recent antitbiotic usage\par - following up with kevin nelson\par - still did not get VCE, smartpill, pending appt with rios in october\par - pending US results for gallbladder polyps, got done 2 days ago\par \par PREVIOUS HX\par Recent EGD- lactose intolerance and SIFO+\par \par Recently treated for SIBO- Finished abx course ( rifaximin and flagyl) two days ago\par Mild improvement in bloating when on abx but since she stopped abx, symptoms are worse\par She complains of nausea, bloating, abd distention lasting all day. No vomiting. Symptoms worse with any intake of foods, especially high fodmaps\par \par She was on a regular diet during abx course- now back to low fodmap\par Reports numerous vaginal yeast infection and currently has one\par Stopped motegrity while on abx- now back on motegrity and also still taking magnesium and BM are normal; no blood

## 2022-12-20 NOTE — ASSESSMENT
[FreeTextEntry1] : 41F with RA tx with infusions of Rituxin Qfew months, subQ methotrexate once week and folic acid, IMO, SIFO presents for telemed for worsening abd bloating constipation \par - continue ibsrela for constipation\par - imo protocol that helped previousl\par - avoid probiotoics when immunocompromised on rituxan/prednisone\par - diet/lifestyle counseling\par - f/u 6wks

## 2023-03-01 ENCOUNTER — APPOINTMENT (OUTPATIENT)
Dept: GASTROENTEROLOGY | Facility: CLINIC | Age: 43
End: 2023-03-01
Payer: COMMERCIAL

## 2023-03-01 PROCEDURE — 99443: CPT

## 2023-03-31 ENCOUNTER — TRANSCRIPTION ENCOUNTER (OUTPATIENT)
Age: 43
End: 2023-03-31

## 2023-04-10 ENCOUNTER — TRANSCRIPTION ENCOUNTER (OUTPATIENT)
Age: 43
End: 2023-04-10

## 2023-06-07 ENCOUNTER — APPOINTMENT (OUTPATIENT)
Dept: GASTROENTEROLOGY | Facility: CLINIC | Age: 43
End: 2023-06-07
Payer: COMMERCIAL

## 2023-06-07 ENCOUNTER — NON-APPOINTMENT (OUTPATIENT)
Age: 43
End: 2023-06-07

## 2023-06-07 DIAGNOSIS — K21.9 GASTRO-ESOPHAGEAL REFLUX DISEASE W/OUT ESOPHAGITIS: ICD-10-CM

## 2023-06-07 PROCEDURE — 99443: CPT

## 2023-06-07 RX ORDER — VITAMIN E (DL,TOCOPHERYL ACET) 180 MG
500 CAPSULE ORAL
Qty: 90 | Refills: 3 | Status: ACTIVE | COMMUNITY
Start: 2022-04-21 | End: 1900-01-01

## 2024-02-07 ENCOUNTER — APPOINTMENT (OUTPATIENT)
Dept: GASTROENTEROLOGY | Facility: CLINIC | Age: 44
End: 2024-02-07

## 2024-04-03 ENCOUNTER — APPOINTMENT (OUTPATIENT)
Dept: GASTROENTEROLOGY | Facility: CLINIC | Age: 44
End: 2024-04-03
Payer: COMMERCIAL

## 2024-04-03 VITALS
OXYGEN SATURATION: 98 % | DIASTOLIC BLOOD PRESSURE: 85 MMHG | RESPIRATION RATE: 16 BRPM | HEIGHT: 64 IN | SYSTOLIC BLOOD PRESSURE: 127 MMHG | HEART RATE: 113 BPM | TEMPERATURE: 97.6 F | WEIGHT: 142 LBS | BODY MASS INDEX: 24.24 KG/M2

## 2024-04-03 DIAGNOSIS — K58.1 IRRITABLE BOWEL SYNDROME WITH CONSTIPATION: ICD-10-CM

## 2024-04-03 DIAGNOSIS — K59.09 OTHER CONSTIPATION: ICD-10-CM

## 2024-04-03 DIAGNOSIS — K92.89 OTHER SPECIFIED DISEASES OF THE DIGESTIVE SYSTEM: ICD-10-CM

## 2024-04-03 PROCEDURE — G2211 COMPLEX E/M VISIT ADD ON: CPT | Mod: NC,1L

## 2024-04-03 PROCEDURE — 99215 OFFICE O/P EST HI 40 MIN: CPT

## 2024-04-03 RX ORDER — TERCONAZOLE 8 MG/G
0.8 CREAM VAGINAL
Qty: 1 | Refills: 0 | Status: DISCONTINUED | COMMUNITY
Start: 2020-09-10 | End: 2024-04-03

## 2024-04-03 RX ORDER — RIFAXIMIN 550 MG/1
550 TABLET ORAL
Qty: 42 | Refills: 0 | Status: DISCONTINUED | COMMUNITY
Start: 2021-11-02 | End: 2024-04-03

## 2024-04-03 RX ORDER — NYSTATIN 500000 [USP'U]/1
500000 TABLET, FILM COATED ORAL 3 TIMES DAILY
Qty: 90 | Refills: 1 | Status: DISCONTINUED | COMMUNITY
Start: 2022-09-30 | End: 2024-04-03

## 2024-04-03 RX ORDER — METRONIDAZOLE 500 MG/1
500 TABLET ORAL 3 TIMES DAILY
Qty: 42 | Refills: 0 | Status: DISCONTINUED | COMMUNITY
Start: 2021-11-02 | End: 2024-04-03

## 2024-04-03 RX ORDER — PANCRELIPASE 60000; 12000; 38000 [USP'U]/1; [USP'U]/1; [USP'U]/1
12000-38000 CAPSULE, DELAYED RELEASE PELLETS ORAL
Qty: 90 | Refills: 3 | Status: DISCONTINUED | COMMUNITY
Start: 2021-08-06 | End: 2024-04-03

## 2024-04-03 RX ORDER — FOLIC ACID 1 MG/1
1 TABLET ORAL
Refills: 0 | Status: DISCONTINUED | COMMUNITY
End: 2024-04-03

## 2024-04-03 RX ORDER — BISMUTH SUBSALICYLATE 262 MG/1
262 TABLET, CHEWABLE ORAL
Qty: 90 | Refills: 3 | Status: DISCONTINUED | COMMUNITY
Start: 2022-04-21 | End: 2024-04-03

## 2024-04-03 RX ORDER — FLUCONAZOLE 150 MG/1
150 TABLET ORAL
Qty: 2 | Refills: 5 | Status: DISCONTINUED | COMMUNITY
Start: 2021-07-13 | End: 2024-04-03

## 2024-04-03 RX ORDER — FLUCONAZOLE 150 MG/1
150 TABLET ORAL
Qty: 3 | Refills: 0 | Status: DISCONTINUED | COMMUNITY
Start: 2022-12-14 | End: 2024-04-03

## 2024-04-03 RX ORDER — AMOXICILLIN AND CLAVULANATE POTASSIUM 875; 125 MG/1; MG/1
875-125 TABLET, COATED ORAL
Qty: 28 | Refills: 0 | Status: DISCONTINUED | COMMUNITY
Start: 2022-11-01 | End: 2024-04-03

## 2024-04-03 RX ORDER — PANTOPRAZOLE 40 MG/1
40 TABLET, DELAYED RELEASE ORAL
Qty: 30 | Refills: 2 | Status: DISCONTINUED | COMMUNITY
Start: 2023-06-07 | End: 2024-04-03

## 2024-04-03 RX ORDER — LACTULOSE 10 G/15ML
10 SOLUTION ORAL
Qty: 1 | Refills: 0 | Status: DISCONTINUED | COMMUNITY
Start: 2023-03-01 | End: 2024-04-03

## 2024-04-03 RX ORDER — NALTREXONE HCL 100 %
POWDER (GRAM) MISCELLANEOUS
Qty: 1 | Refills: 2 | Status: DISCONTINUED | COMMUNITY
Start: 2022-06-16 | End: 2024-04-03

## 2024-04-03 RX ORDER — PANCRELIPASE 60000; 12000; 38000 [USP'U]/1; [USP'U]/1; [USP'U]/1
12000-38000 CAPSULE, DELAYED RELEASE PELLETS ORAL
Qty: 810 | Refills: 2 | Status: DISCONTINUED | COMMUNITY
Start: 2020-08-18 | End: 2024-04-03

## 2024-04-03 RX ORDER — RIFAXIMIN 550 MG/1
550 TABLET ORAL
Qty: 42 | Refills: 3 | Status: DISCONTINUED | COMMUNITY
Start: 2022-06-16 | End: 2024-04-03

## 2024-04-03 RX ORDER — METRONIDAZOLE 500 MG/1
500 TABLET ORAL
Qty: 30 | Refills: 0 | Status: DISCONTINUED | COMMUNITY
Start: 2022-12-14 | End: 2024-04-03

## 2024-04-03 RX ORDER — NEOMYCIN SULFATE 500 MG/1
500 TABLET ORAL TWICE DAILY
Qty: 28 | Refills: 0 | Status: DISCONTINUED | COMMUNITY
Start: 2022-07-25 | End: 2024-04-03

## 2024-04-03 RX ORDER — FLUCONAZOLE 100 MG/1
100 TABLET ORAL
Qty: 21 | Refills: 0 | Status: DISCONTINUED | COMMUNITY
Start: 2022-01-14 | End: 2024-04-03

## 2024-04-03 RX ORDER — ERYTHROMYCIN 250 MG/1
250 TABLET, DELAYED RELEASE ORAL
Qty: 90 | Refills: 3 | Status: DISCONTINUED | COMMUNITY
Start: 2021-05-18 | End: 2024-04-03

## 2024-04-03 RX ORDER — TENAPANOR HYDROCHLORIDE 53.2 MG/1
50 TABLET ORAL
Qty: 180 | Refills: 3 | Status: ACTIVE | COMMUNITY
Start: 2022-09-30 | End: 1900-01-01

## 2024-04-03 RX ORDER — METRONIDAZOLE 250 MG/1
250 TABLET ORAL
Qty: 42 | Refills: 0 | Status: DISCONTINUED | COMMUNITY
Start: 2022-11-17 | End: 2024-04-03

## 2024-04-03 RX ORDER — METRONIDAZOLE 250 MG/1
250 TABLET ORAL
Qty: 42 | Refills: 0 | Status: DISCONTINUED | COMMUNITY
Start: 2022-11-01 | End: 2024-04-03

## 2024-04-03 RX ORDER — NYSTATIN 100000 [USP'U]/ML
100000 SUSPENSION ORAL
Qty: 1 | Refills: 0 | Status: DISCONTINUED | COMMUNITY
Start: 2022-02-04 | End: 2024-04-03

## 2024-04-03 RX ORDER — FLUCONAZOLE 100 MG/1
100 TABLET ORAL DAILY
Qty: 5 | Refills: 0 | Status: DISCONTINUED | COMMUNITY
Start: 2022-11-01 | End: 2024-04-03

## 2024-04-03 NOTE — PHYSICAL EXAM
[General Appearance - Alert] : alert [General Appearance - In No Acute Distress] : in no acute distress [] : normal voice and communication [Alert] : alert [No Acute Distress] : no acute distress [Sclera] : the sclera and conjunctiva were normal [Hearing Threshold Finger Rub Not [place]] : hearing was normal [No Respiratory Distress] : no respiratory distress [Normal Appearance] : the appearance of the neck was normal [Abdomen Tenderness] : non-tender [No Masses] : no abdominal mass palpated [Abnormal Walk] : normal gait [Abdomen Soft] : soft [Oriented To Time, Place, And Person] : oriented to person, place, and time

## 2024-04-03 NOTE — PHYSICAL EXAM
[General Appearance - Alert] : alert [General Appearance - In No Acute Distress] : in no acute distress [] : normal voice and communication [Alert] : alert [No Acute Distress] : no acute distress [Sclera] : the sclera and conjunctiva were normal [Hearing Threshold Finger Rub Not [place]] : hearing was normal [Normal Appearance] : the appearance of the neck was normal [No Respiratory Distress] : no respiratory distress [Abdomen Tenderness] : non-tender [No Masses] : no abdominal mass palpated [Abnormal Walk] : normal gait [Abdomen Soft] : soft [Oriented To Time, Place, And Person] : oriented to person, place, and time

## 2024-04-07 NOTE — HISTORY OF PRESENT ILLNESS
[FreeTextEntry1] : 43F with RA tx with infusions of Rituxan Q6 months, MARIA FERNANDA POWELL, presents for follow up of constipation and abd bloating   Today she reports persistent bloating and chronic constipation. Restarted Linzess 290 about two months ago which helped initially but losing effectiveness. Taking magnesium oxide 500. Reports previously had improved symptoms with alternating between linzess and ibsrela as well as low dose naltrexone. While taking Linzess, dulcolax prn, fiber supplements patient reports 1-2BMs daily, typically liquid to soft without blood. Feeling of incomplete evacuation. Has periumbilical discomfort which is new but she notices it only after taking herbs that were recommended by ID for parasite. Rare reflux with certain foods, not taking PPI, taking tums prn. Denies nausea, vomiting, unintentional weight loss, blood in stool, dysphagia,   Has been following with multiple ID providers for Blastocystis hominis that she acquired while in [place]. Multiple failed treatments. Is currently completing two separate courses of Ivermectin. While on treatment, has been holding methotrexate. Working to find a parasite specialist.   Last SIBO treatment was 2 years ago and she is inquiring about retreatment/testing. Reports she was planning to start elemental diet but wanted to complete parasite treatment first. Following with an outside dietician.   Other prior medications used: motegrity- caused depression erythromycin- caused yeast infection  no improvement with creon  naltrexone low dose 0.5-1mg for motility and RA : worked well at low dose but caused SE at higher doses lactulose  labs 10/23 reports unremarkable  pelvic US recently  NSAIDs; denies tobacco, etoh, drugs; denies   meds: linzess 290, magnesium 500mg, Rituxan q6 months , currently off methotrexate while completing ivermectin parasite treatment) OTC: herbs for parasites, vitamin d3, fish oil, fiber supplements, vitamin a, vitamin b12, simethicone, Dulcolax     PRIOR: 12/14/22 - saw ID - o/p shows endolimax, blastocystis - s/p bactrim x1week, felt terrible on the treatment - started ibsrela   11/1 feels terrible, bloated IBS-rela HELPED but had to stop/start during rifaximin infusion stopped bioguay tried to increase LDN to 1.5 but didn't feel "right" and helps with RA but stopped it and restarted at 0.5mg  on mg ox  9/30/22 -went to [place] for a month - on LDN 1mg, linzess 290 - started candibactin - constipated-- once a day with mg citrate/triphala  - 3wks xifaxan, 2wks xifaxan/neomycin -   4/21/22 pt was feeling great after previous treatments for IMO/SIFO  sxs werent gone 100% but tolerable/minimal and she was reintroducing fodmaps successfully then recently developed NEW DIARRHEA (never has diarrhea) and worsening bloating cat diagnosed with parasite, getting treated  1/14/22 - motility was good in November - saw ID who gave her mebendazole for symptoms and +gut zoomer test - herbal protocol for parasites per Nutritionist  - O+P after 4 weeks were negative  - rifaximin+flagyl end of December, significant improvement of symptoms while on regimen and was even tolerating carbs, higher FODMAP foods  - however, following course, she has a very bad taste in her mouth , white coating in mouth, return of bloating symptoms, yeast infection symptoms, just like last time she had SIFO - motility has been sluggish despite low dose naltrexone, currently at 1.5 mg, started 6 days ago  - has BM daily, but does take castor oil or dulcolax because quantity is low and consistency is [place] 3 - is taking linzess 145 mcg, started approx one month ago  - next rituxan is in 2 weeks   11/2/21 - RIFAXIMIN and FLAGYL, first week of July and did help symptoms  - past 3 months symptoms been better (switched from magnesium oxide to citrate) - biogaia gastrus ( lactobacillus reuteri) and thinks been helping. last 3 weeks motility much better. good BM daily (twice a day) bristol 3 or 4  - still not tolerating high FODMAPS (onions or garlic), certain carbohydrates, bloating after dinner  - methane high, trichonomas parasite - gut zoomer test  - changed nutritionist who has been helpful.. recommending herbal tx for parasite  - VCE: stopped betaine due to erosions seen. transit time normal.  - nausea is bad  - Rituxan infusion in one month which is so much immunosuppressant  - wants to treat SIBO and parasites again  - prokinetic ?  - Dr. Carreon and recommended sitz marker study and gastric emptying with colon. pernicious anemia and gastrin  8.6.21 - pt was doing well until recent antitbiotic usage - following up with kevin nelson - still did not get VCE, smartpill, pending appt with rios in october - pending US results for gallbladder polyps, got done 2 days ago  PREVIOUS HX Recent EGD- lactose intolerance and SIFO+  Recently treated for SIBO- Finished abx course ( rifaximin and flagyl) two days ago Mild improvement in bloating when on abx but since she stopped abx, symptoms are worse She complains of nausea, bloating, abd distention lasting all day. No vomiting. Symptoms worse with any intake of foods, especially high fodmaps  She was on a regular diet during abx course- now back to low fodmap Reports numerous vaginal yeast infection and currently has one Stopped motegrity while on abx- now back on motegrity and also still taking magnesium and BM are normal; no blood

## 2024-04-07 NOTE — ASSESSMENT
[FreeTextEntry1] : 43F with RA tx with infusions of Rituxan Q6 months, MARIA FERNANDA POWELL, presents for follow up of persistent constipation and abd bloating  In depth discussion about patient's complex case, recommend she have a consultation with a SIBO specialist given many previously failed treatments and persistent symptoms. No alarm symptoms, reports recent lab work was unremarkable. For the time being, will focus on constipation and advised patient to continue following with ID for treatment of parasite. Plan - recommend SIBO specialist for specialized care and prescription management of Naltrexone if patient wishes to pursue this - restart ibsrela for constipation and continue Linzess 290 - continue magnesium oxide as needed - continue to work with outside dietician for chronic constipation and for possible elemental diet s/p parasite treatment as discussed with prior provider - continue to follow with ID for parasite management  - after parasite treatment and improved constipation management, may consider repeat SIBO testing - recommend she stop herbal supplement if it is causing discomfort and f/u with ID who is recommending  - may consider referral for CBT in the future   Follow up in 6-12 mos

## 2024-04-07 NOTE — HISTORY OF PRESENT ILLNESS
[FreeTextEntry1] : 43F with RA tx with infusions of Rituxan Q6 months, MARIA FERNANDA POWELL, presents for follow up of constipation and abd bloating   Today she reports persistent bloating and chronic constipation. Restarted Linzess 290 about two months ago which helped initially but losing effectiveness. Taking magnesium oxide 500. Reports previously had improved symptoms with alternating between linzess and ibsrela as well as low dose naltrexone. While taking Linzess, dulcolax prn, fiber supplements patient reports 1-2BMs daily, typically liquid to soft without blood. Feeling of incomplete evacuation. Has periumbilical discomfort which is new but she notices it only after taking herbs that were recommended by ID for parasite. Rare reflux with certain foods, not taking PPI, taking tums prn. Denies nausea, vomiting, unintentional weight loss, blood in stool, dysphagia,   Has been following with multiple ID providers for Blastocystis hominis that she acquired while in [place]. Multiple failed treatments. Is currently completing two separate courses of Ivermectin. While on treatment, has been holding methotrexate. Working to find a parasite specialist.   Last SIBO treatment was 2 years ago and she is inquiring about retreatment/testing. Reports she was planning to start elemental diet but wanted to complete parasite treatment first. Following with an outside dietician.   Other prior medications used: motegrity- caused depression erythromycin- caused yeast infection  no improvement with creon  naltrexone low dose 0.5-1mg for motility and RA : worked well at low dose but caused SE at higher doses lactulose  labs 10/23 reports unremarkable  pelvic US recently  NSAIDs; denies tobacco, etoh, drugs; denies   meds: linzess 290, magnesium 500mg, Rituxan q6 months , currently off methotrexate while completing ivermectin parasite treatment) OTC: herbs for parasites, vitamin d3, fish oil, fiber supplements, vitamin a, vitamin b12, simethicone, Dulcolax     PRIOR: 12/14/22 - saw ID - o/p shows endolimax, blastocystis - s/p bactrim x1week, felt terrible on the treatment - started ibsrela   11/1 feels terrible, bloated IBS-rela HELPED but had to stop/start during rifaximin infusion stopped bioguay tried to increase LDN to 1.5 but didn't feel "right" and helps with RA but stopped it and restarted at 0.5mg  on mg ox  9/30/22 -went to [place] for a month - on LDN 1mg, linzess 290 - started candibactin - constipated-- once a day with mg citrate/triphala  - 3wks xifaxan, 2wks xifaxan/neomycin -   4/21/22 pt was feeling great after previous treatments for IMO/SIFO  sxs werent gone 100% but tolerable/minimal and she was reintroducing fodmaps successfully then recently developed NEW DIARRHEA (never has diarrhea) and worsening bloating cat diagnosed with parasite, getting treated  1/14/22 - motility was good in November - saw ID who gave her mebendazole for symptoms and +gut zoomer test - herbal protocol for parasites per Nutritionist  - O+P after 4 weeks were negative  - rifaximin+flagyl end of December, significant improvement of symptoms while on regimen and was even tolerating carbs, higher FODMAP foods  - however, following course, she has a very bad taste in her mouth , white coating in mouth, return of bloating symptoms, yeast infection symptoms, just like last time she had SIFO - motility has been sluggish despite low dose naltrexone, currently at 1.5 mg, started 6 days ago  - has BM daily, but does take castor oil or dulcolax because quantity is low and consistency is [place] 3 - is taking linzess 145 mcg, started approx one month ago  - next rituxan is in 2 weeks   11/2/21 - RIFAXIMIN and FLAGYL, first week of July and did help symptoms  - past 3 months symptoms been better (switched from magnesium oxide to citrate) - biogaia gastrus ( lactobacillus reuteri) and thinks been helping. last 3 weeks motility much better. good BM daily (twice a day) bristol 3 or 4  - still not tolerating high FODMAPS (onions or garlic), certain carbohydrates, bloating after dinner  - methane high, trichonomas parasite - gut zoomer test  - changed nutritionist who has been helpful.. recommending herbal tx for parasite  - VCE: stopped betaine due to erosions seen. transit time normal.  - nausea is bad  - Rituxan infusion in one month which is so much immunosuppressant  - wants to treat SIBO and parasites again  - prokinetic ?  - Dr. Carreon and recommended sitz marker study and gastric emptying with colon. pernicious anemia and gastrin  8.6.21 - pt was doing well until recent antitbiotic usage - following up with kevin nelson - still did not get VCE, smartpill, pending appt with rios in october - pending US results for gallbladder polyps, got done 2 days ago  PREVIOUS HX Recent EGD- lactose intolerance and SIFO+  Recently treated for SIBO- Finished abx course ( rifaximin and flagyl) two days ago Mild improvement in bloating when on abx but since she stopped abx, symptoms are worse She complains of nausea, bloating, abd distention lasting all day. No vomiting. Symptoms worse with any intake of foods, especially high fodmaps  She was on a regular diet during abx course- now back to low fodmap Reports numerous vaginal yeast infection and currently has one Stopped motegrity while on abx- now back on motegrity and also still taking magnesium and BM are normal; no blood

## 2024-04-07 NOTE — END OF VISIT
[Time Spent: ___ minutes] : I have spent [unfilled] minutes of time on the encounter. [FreeTextEntry3] : Patient was seen and discussed with np kevin gonzalez Note was edited and amended as necessary Patient was physically seen at [address]

## 2024-04-07 NOTE — END OF VISIT
[FreeTextEntry3] : Patient was seen and discussed with np kevin gonzalez Note was edited and amended as necessary Patient was physically seen at [address] [Time Spent: ___ minutes] : I have spent [unfilled] minutes of time on the encounter.

## 2024-04-17 ENCOUNTER — TRANSCRIPTION ENCOUNTER (OUTPATIENT)
Age: 44
End: 2024-04-17

## 2024-04-17 RX ORDER — LINACLOTIDE 290 UG/1
290 CAPSULE, GELATIN COATED ORAL DAILY
Qty: 90 | Refills: 2 | Status: ACTIVE | COMMUNITY
Start: 2022-01-03 | End: 1900-01-01

## 2024-04-18 ENCOUNTER — TRANSCRIPTION ENCOUNTER (OUTPATIENT)
Age: 44
End: 2024-04-18

## 2024-09-30 ENCOUNTER — APPOINTMENT (OUTPATIENT)
Dept: GASTROENTEROLOGY | Facility: CLINIC | Age: 44
End: 2024-09-30